# Patient Record
Sex: FEMALE | Race: WHITE | NOT HISPANIC OR LATINO | URBAN - METROPOLITAN AREA
[De-identification: names, ages, dates, MRNs, and addresses within clinical notes are randomized per-mention and may not be internally consistent; named-entity substitution may affect disease eponyms.]

---

## 2017-04-19 ENCOUNTER — APPOINTMENT (OUTPATIENT)
Dept: WOMENS IMAGING | Facility: HOSPITAL | Age: 52
End: 2017-04-19

## 2017-04-19 PROCEDURE — 77067 SCR MAMMO BI INCL CAD: CPT | Performed by: RADIOLOGY

## 2018-05-24 ENCOUNTER — APPOINTMENT (OUTPATIENT)
Dept: WOMENS IMAGING | Facility: HOSPITAL | Age: 53
End: 2018-05-24

## 2018-05-24 PROCEDURE — 77067 SCR MAMMO BI INCL CAD: CPT | Performed by: RADIOLOGY

## 2019-07-09 ENCOUNTER — APPOINTMENT (OUTPATIENT)
Dept: WOMENS IMAGING | Facility: HOSPITAL | Age: 54
End: 2019-07-09

## 2019-07-09 PROCEDURE — 77067 SCR MAMMO BI INCL CAD: CPT | Performed by: RADIOLOGY

## 2020-07-10 ENCOUNTER — APPOINTMENT (OUTPATIENT)
Dept: WOMENS IMAGING | Facility: HOSPITAL | Age: 55
End: 2020-07-10

## 2020-07-10 PROCEDURE — 77067 SCR MAMMO BI INCL CAD: CPT | Performed by: RADIOLOGY

## 2022-07-08 RX ORDER — METHOCARBAMOL 750 MG/1
TABLET, FILM COATED ORAL
Qty: 60 TABLET | Refills: 5 | Status: SHIPPED | OUTPATIENT
Start: 2022-07-08 | End: 2022-11-07 | Stop reason: SDUPTHER

## 2022-07-22 RX ORDER — OXYBUTYNIN CHLORIDE 10 MG/1
TABLET, EXTENDED RELEASE ORAL
Qty: 90 TABLET | Refills: 1 | OUTPATIENT
Start: 2022-07-22

## 2022-08-02 ENCOUNTER — TELEPHONE (OUTPATIENT)
Dept: FAMILY MEDICINE CLINIC | Facility: CLINIC | Age: 57
End: 2022-08-02

## 2022-08-02 NOTE — TELEPHONE ENCOUNTER
Caller: Martinez Toma    Relationship: Self    Best call back number: [phone]    What medication are you requesting: SOMETHING TO HELP WITH BODY ACHES AND FEVER    What are your current symptoms: BODY ACHES AND FEVER. 102.8 ON 8/1/22 .4 ON 8/2/22    How long have you been experiencing symptoms: 8/1/22    Have you had these symptoms before:    [] Yes  [x] No    Have you been treated for these symptoms before:   [] Yes  [x] No    If a prescription is needed, what is your preferred pharmacy and phone number:    [facility]/pharmacy #[id number] - [facility] [address] - [phone] [facility] [phone]      Additional notes: PLEASE CALL PATIENT WHEN MEDICATION IS CALLED IN.

## 2022-08-03 ENCOUNTER — TELEPHONE (OUTPATIENT)
Dept: FAMILY MEDICINE CLINIC | Facility: CLINIC | Age: 57
End: 2022-08-03

## 2022-08-03 NOTE — TELEPHONE ENCOUNTER
Caller: Toma Martinez    Relationship to patient: Self    Best call back number: [phone]    Chief complaint: CHEST CONGESTION     Type of visit: MYCHART VIDEO VISIT    Requested date: ASAP    If rescheduling, when is the original appointment: N/A    Additional notes: PATIENT STATED THAT SHE IS HAVING LOTS OF CHEST CONGESTION AND HER FEVER BROKE BUT WANTED TO SEE ABOUT GETTING TELEHEALTH DUE TO BEING POSITIVE FOR COVID     PLEASE ADVISE

## 2022-08-26 RX ORDER — OXYBUTYNIN CHLORIDE 10 MG/1
TABLET, EXTENDED RELEASE ORAL
Qty: 90 TABLET | Refills: 1 | OUTPATIENT
Start: 2022-08-26

## 2022-08-30 RX ORDER — LISINOPRIL 20 MG/1
TABLET ORAL
Qty: 90 TABLET | Refills: 1 | OUTPATIENT
Start: 2022-08-30

## 2022-09-14 RX ORDER — TRAZODONE HYDROCHLORIDE 50 MG/1
TABLET ORAL
Qty: 270 TABLET | Refills: 3 | OUTPATIENT
Start: 2022-09-14

## 2022-10-03 RX ORDER — BUSPIRONE HYDROCHLORIDE 7.5 MG/1
TABLET ORAL
Qty: 360 TABLET | Refills: 1 | Status: SHIPPED | OUTPATIENT
Start: 2022-10-03 | End: 2022-11-07

## 2022-10-03 RX ORDER — LEVOTHYROXINE SODIUM 0.07 MG/1
TABLET ORAL
Qty: 90 TABLET | Refills: 1 | Status: SHIPPED | OUTPATIENT
Start: 2022-10-03 | End: 2022-11-07 | Stop reason: SDUPTHER

## 2022-10-07 ENCOUNTER — TRANSCRIBE ORDERS (OUTPATIENT)
Dept: LAB | Facility: HOSPITAL | Age: 57
End: 2022-10-07

## 2022-10-07 ENCOUNTER — LAB (OUTPATIENT)
Dept: LAB | Facility: HOSPITAL | Age: 57
End: 2022-10-07

## 2022-10-07 DIAGNOSIS — R63.5 ABNORMAL WEIGHT GAIN: ICD-10-CM

## 2022-10-07 DIAGNOSIS — R63.5 ABNORMAL WEIGHT GAIN: Primary | ICD-10-CM

## 2022-10-07 LAB
ALBUMIN SERPL-MCNC: 4.2 G/DL (ref 3.5–5.2)
ALBUMIN/GLOB SERPL: 1.6 G/DL
ALP SERPL-CCNC: 107 U/L (ref 39–117)
ALT SERPL W P-5'-P-CCNC: 21 U/L (ref 1–33)
ANION GAP SERPL CALCULATED.3IONS-SCNC: 7.6 MMOL/L (ref 5–15)
AST SERPL-CCNC: 23 U/L (ref 1–32)
BASOPHILS # BLD AUTO: 0.03 10*3/MM3 (ref 0–0.2)
BASOPHILS NFR BLD AUTO: 0.5 % (ref 0–1.5)
BILIRUB SERPL-MCNC: 0.2 MG/DL (ref 0–1.2)
BUN SERPL-MCNC: 17 MG/DL (ref 6–20)
BUN/CREAT SERPL: 12.9 (ref 7–25)
CALCIUM SPEC-SCNC: 9.2 MG/DL (ref 8.6–10.5)
CHLORIDE SERPL-SCNC: 103 MMOL/L (ref 98–107)
CHOLEST SERPL-MCNC: 195 MG/DL (ref 0–200)
CO2 SERPL-SCNC: 27.4 MMOL/L (ref 22–29)
CREAT SERPL-MCNC: 1.32 MG/DL (ref 0.57–1)
DEPRECATED RDW RBC AUTO: 39.2 FL (ref 37–54)
EGFRCR SERPLBLD CKD-EPI 2021: 47.2 ML/MIN/1.73
EOSINOPHIL # BLD AUTO: 0.09 10*3/MM3 (ref 0–0.4)
EOSINOPHIL NFR BLD AUTO: 1.5 % (ref 0.3–6.2)
ERYTHROCYTE [DISTWIDTH] IN BLOOD BY AUTOMATED COUNT: 12.8 % (ref 12.3–15.4)
GLOBULIN UR ELPH-MCNC: 2.6 GM/DL
GLUCOSE SERPL-MCNC: 89 MG/DL (ref 65–99)
HBA1C MFR BLD: 5.9 % (ref 4.8–5.6)
HCT VFR BLD AUTO: 39.2 % (ref 34–46.6)
HDLC SERPL QL: 3.68
HDLC SERPL-MCNC: 53 MG/DL (ref 40–60)
HGB BLD-MCNC: 13.3 G/DL (ref 12–15.9)
IMM GRANULOCYTES # BLD AUTO: 0.01 10*3/MM3 (ref 0–0.05)
IMM GRANULOCYTES NFR BLD AUTO: 0.2 % (ref 0–0.5)
LDLC SERPL CALC-MCNC: 111 MG/DL (ref 0–100)
LYMPHOCYTES # BLD AUTO: 2.13 10*3/MM3 (ref 0.7–3.1)
LYMPHOCYTES NFR BLD AUTO: 35.9 % (ref 19.6–45.3)
MCH RBC QN AUTO: 28.6 PG (ref 26.6–33)
MCHC RBC AUTO-ENTMCNC: 33.9 G/DL (ref 31.5–35.7)
MCV RBC AUTO: 84.3 FL (ref 79–97)
MONOCYTES # BLD AUTO: 0.35 10*3/MM3 (ref 0.1–0.9)
MONOCYTES NFR BLD AUTO: 5.9 % (ref 5–12)
NEUTROPHILS NFR BLD AUTO: 3.33 10*3/MM3 (ref 1.7–7)
NEUTROPHILS NFR BLD AUTO: 56 % (ref 42.7–76)
NRBC BLD AUTO-RTO: 0 /100 WBC (ref 0–0.2)
PLATELET # BLD AUTO: 291 10*3/MM3 (ref 140–450)
PMV BLD AUTO: 10.5 FL (ref 6–12)
POTASSIUM SERPL-SCNC: 4.5 MMOL/L (ref 3.5–5.2)
PROT SERPL-MCNC: 6.8 G/DL (ref 6–8.5)
RBC # BLD AUTO: 4.65 10*6/MM3 (ref 3.77–5.28)
SODIUM SERPL-SCNC: 138 MMOL/L (ref 136–145)
TRIGL SERPL-MCNC: 176 MG/DL (ref 0–150)
TSH SERPL DL<=0.05 MIU/L-ACNC: 1.99 UIU/ML (ref 0.27–4.2)
VLDLC SERPL-MCNC: 31 MG/DL (ref 5–40)
WBC NRBC COR # BLD: 5.94 10*3/MM3 (ref 3.4–10.8)

## 2022-10-07 PROCEDURE — 36415 COLL VENOUS BLD VENIPUNCTURE: CPT

## 2022-10-07 PROCEDURE — 83036 HEMOGLOBIN GLYCOSYLATED A1C: CPT | Performed by: NURSE PRACTITIONER

## 2022-10-07 PROCEDURE — 80061 LIPID PANEL: CPT

## 2022-10-07 PROCEDURE — 80050 GENERAL HEALTH PANEL: CPT

## 2022-11-07 ENCOUNTER — TELEMEDICINE (OUTPATIENT)
Dept: FAMILY MEDICINE CLINIC | Facility: CLINIC | Age: 57
End: 2022-11-07

## 2022-11-07 DIAGNOSIS — U09.9 POST-COVID CHRONIC FATIGUE: Primary | ICD-10-CM

## 2022-11-07 DIAGNOSIS — G93.32 POST-COVID CHRONIC FATIGUE: Primary | ICD-10-CM

## 2022-11-07 PROCEDURE — 99213 OFFICE O/P EST LOW 20 MIN: CPT | Performed by: FAMILY MEDICINE

## 2022-11-07 RX ORDER — BUSPIRONE HYDROCHLORIDE 15 MG/1
7.5 TABLET ORAL 2 TIMES DAILY
Qty: 45 TABLET | Refills: 1 | Status: SHIPPED | OUTPATIENT
Start: 2022-11-07 | End: 2023-03-15

## 2022-11-07 RX ORDER — METHOCARBAMOL 750 MG/1
750 TABLET, FILM COATED ORAL 3 TIMES DAILY
Qty: 90 TABLET | Refills: 1 | Status: SHIPPED | OUTPATIENT
Start: 2022-11-07 | End: 2023-01-30

## 2022-11-07 RX ORDER — BUPROPION HYDROCHLORIDE 150 MG/1
150 TABLET ORAL EVERY MORNING
Qty: 30 TABLET | Refills: 5 | Status: SHIPPED | OUTPATIENT
Start: 2022-11-07 | End: 2022-11-28

## 2022-11-07 RX ORDER — OXYBUTYNIN CHLORIDE 10 MG/1
10 TABLET, EXTENDED RELEASE ORAL DAILY
Qty: 90 TABLET | Refills: 1 | Status: SHIPPED | OUTPATIENT
Start: 2022-11-07

## 2022-11-07 RX ORDER — TRAZODONE HYDROCHLORIDE 50 MG/1
50 TABLET ORAL NIGHTLY
Qty: 90 TABLET | Refills: 1 | Status: SHIPPED | OUTPATIENT
Start: 2022-11-07

## 2022-11-07 RX ORDER — LISINOPRIL 20 MG/1
20 TABLET ORAL DAILY
Qty: 90 TABLET | Refills: 1 | Status: SHIPPED | OUTPATIENT
Start: 2022-11-07

## 2022-11-07 RX ORDER — OXYBUTYNIN CHLORIDE 10 MG/1
10 TABLET, EXTENDED RELEASE ORAL DAILY
COMMUNITY
End: 2022-11-07 | Stop reason: SDUPTHER

## 2022-11-07 RX ORDER — LEVOTHYROXINE SODIUM 0.07 MG/1
75 TABLET ORAL DAILY
Qty: 90 TABLET | Refills: 1 | Status: SHIPPED | OUTPATIENT
Start: 2022-11-07

## 2022-11-07 RX ORDER — LISINOPRIL 20 MG/1
20 TABLET ORAL DAILY
COMMUNITY
End: 2022-11-07 | Stop reason: SDUPTHER

## 2022-11-07 RX ORDER — TRAZODONE HYDROCHLORIDE 50 MG/1
50 TABLET ORAL NIGHTLY
COMMUNITY
End: 2022-11-07 | Stop reason: SDUPTHER

## 2022-11-07 NOTE — PROGRESS NOTES
Patient was seen today through synchronous audio/video technology. Verbal consent was obtained. The patient was located at home. Dr. Basurto was located at [facility] in [place], [place]. Vitals signs were not obtained due to lack of home monitoring access.       Date: 2022   Patient Name: Toma Martinez  : 1965   MRN: [id number]     Chief Complaint:    Chief Complaint   Patient presents with   • Med Refill       History of Present Illness: Toma Martinez is a 57 y.o. female who is here today to follow up for Medication refills.  Today she complains of significant chronic fatigue after having COVID a few months ago.  She reports difficulty functioning and not feeling well rested when she wakes in the morning.  She feels like she needs multiple naps throughout the day.  This is interfering with her daily life and ability to be productive with her job.           Review of Systems:   Review of Systems   Constitutional: Positive for fatigue. Negative for chills and fever.   Respiratory: Negative for cough and shortness of breath.    Cardiovascular: Negative for chest pain and palpitations.   Gastrointestinal: Negative for abdominal pain, constipation, diarrhea, nausea and vomiting.   Musculoskeletal: Negative for back pain and myalgias.   Neurological: Negative for dizziness and headache.   Psychiatric/Behavioral: Negative for depressed mood. The patient is not nervous/anxious.        Past Medical History:   Past Medical History:   Diagnosis Date   • Breast cyst, left    • Cholelithiasis    • Depression     DRUG THERAPY   • Depressive disorder    • Hypertension    • Tonsillitis        Past Surgical History:   Past Surgical History:   Procedure Laterality Date   • BREAST CYST EXCISION Left     BENIGN   • CHOLECYSTECTOMY  2009   • TONSILLECTOMY         Family History:   Family History   Problem Relation Age of Onset   • Hypertension Mother    • Clotting disorder Father         BLOOD CLOT    • Coronary artery disease Maternal Grandmother    • Coronary artery disease Maternal Grandfather    • Stroke Paternal Grandmother        Social History:   Social History     Socioeconomic History   • Marital status:    Tobacco Use   • Smoking status: Never   • Smokeless tobacco: Never   Vaping Use   • Vaping Use: Never used   Substance and Sexual Activity   • Alcohol use: Never   • Drug use: Never   • Sexual activity: Defer       Medications:     Current Outpatient Medications:   •  levothyroxine (SYNTHROID, LEVOTHROID) 75 MCG tablet, Take 1 tablet by mouth Daily., Disp: 90 tablet, Rfl: 1  •  lisinopril (PRINIVIL,ZESTRIL) 20 MG tablet, Take 1 tablet by mouth Daily., Disp: 90 tablet, Rfl: 1  •  methocarbamol (ROBAXIN) 750 MG tablet, Take 1 tablet by mouth 3 (Three) Times a Day., Disp: 90 tablet, Rfl: 1  •  oxybutynin XL (DITROPAN-XL) 10 MG 24 hr tablet, Take 1 tablet by mouth Daily., Disp: 90 tablet, Rfl: 1  •  traZODone (DESYREL) 50 MG tablet, Take 1 tablet by mouth Every Night., Disp: 90 tablet, Rfl: 1  •  buPROPion XL (Wellbutrin XL) 150 MG 24 hr tablet, Take 1 tablet by mouth Every Morning., Disp: 30 tablet, Rfl: 5  •  busPIRone (BUSPAR) 15 MG tablet, Take 0.5 tablets by mouth 2 (Two) Times a Day., Disp: 45 tablet, Rfl: 1    Allergies:   Allergies   Allergen Reactions   • Penicillins Hives       PHQ-2 Total Score: 0   PHQ-9 Total Score: 0     Physical Exam:  Vital Signs: There were no vitals filed for this visit.  There is no height or weight on file to calculate BMI.     Physical Exam  Vitals and nursing note reviewed.   Constitutional:       Appearance: Normal appearance.   HENT:      Head: Normocephalic.   Pulmonary:      Effort: Pulmonary effort is normal.   Neurological:      Mental Status: She is alert and oriented to person, place, and time.   Psychiatric:         Mood and Affect: Mood normal.         Behavior: Behavior normal.            Assessment/Plan:   Diagnoses and all orders for this  visit:    1. Post-COVID chronic fatigue (Primary)  Assessment & Plan:  I have had some success with Wellbutrin 150 mg daily for post COVID fatigue.  Patient is agreeable to trying this.  Side effects discussed, follow-up in 3 weeks    Orders:  -     buPROPion XL (Wellbutrin XL) 150 MG 24 hr tablet; Take 1 tablet by mouth Every Morning.  Dispense: 30 tablet; Refill: 5    Other orders  -     levothyroxine (SYNTHROID, LEVOTHROID) 75 MCG tablet; Take 1 tablet by mouth Daily.  Dispense: 90 tablet; Refill: 1  -     lisinopril (PRINIVIL,ZESTRIL) 20 MG tablet; Take 1 tablet by mouth Daily.  Dispense: 90 tablet; Refill: 1  -     methocarbamol (ROBAXIN) 750 MG tablet; Take 1 tablet by mouth 3 (Three) Times a Day.  Dispense: 90 tablet; Refill: 1  -     oxybutynin XL (DITROPAN-XL) 10 MG 24 hr tablet; Take 1 tablet by mouth Daily.  Dispense: 90 tablet; Refill: 1  -     traZODone (DESYREL) 50 MG tablet; Take 1 tablet by mouth Every Night.  Dispense: 90 tablet; Refill: 1  -     busPIRone (BUSPAR) 15 MG tablet; Take 0.5 tablets by mouth 2 (Two) Times a Day.  Dispense: 45 tablet; Refill: 1         Follow Up:   Return in about 3 weeks (around 11/28/2022) for Med Recheck.    Aspen Basurto DO  [facility] [place] [facility]

## 2022-11-09 NOTE — ASSESSMENT & PLAN NOTE
I have had some success with Wellbutrin 150 mg daily for post COVID fatigue.  Patient is agreeable to trying this.  Side effects discussed, follow-up in 3 weeks

## 2022-11-28 ENCOUNTER — OFFICE VISIT (OUTPATIENT)
Dept: FAMILY MEDICINE CLINIC | Facility: CLINIC | Age: 57
End: 2022-11-28

## 2022-11-28 DIAGNOSIS — R05.1 ACUTE COUGH: ICD-10-CM

## 2022-11-28 DIAGNOSIS — U09.9 POST-COVID CHRONIC FATIGUE: Primary | ICD-10-CM

## 2022-11-28 DIAGNOSIS — G93.32 POST-COVID CHRONIC FATIGUE: Primary | ICD-10-CM

## 2022-11-28 PROCEDURE — 99213 OFFICE O/P EST LOW 20 MIN: CPT | Performed by: FAMILY MEDICINE

## 2022-11-28 RX ORDER — BUPROPION HYDROCHLORIDE 300 MG/1
300 TABLET ORAL EVERY MORNING
Qty: 90 TABLET | Refills: 1 | Status: SHIPPED | OUTPATIENT
Start: 2022-11-28

## 2022-11-28 RX ORDER — DEXTROMETHORPHAN HYDROBROMIDE AND PROMETHAZINE HYDROCHLORIDE 15; 6.25 MG/5ML; MG/5ML
5 SYRUP ORAL 4 TIMES DAILY PRN
Qty: 240 ML | Refills: 0 | Status: SHIPPED | OUTPATIENT
Start: 2022-11-28

## 2022-11-28 RX ORDER — METHYLPREDNISOLONE 4 MG/1
TABLET ORAL
Qty: 21 TABLET | Refills: 0 | Status: SHIPPED | OUTPATIENT
Start: 2022-11-28 | End: 2023-01-06

## 2022-11-28 NOTE — PROGRESS NOTES
Patient was seen today through synchronous audio/video technology. Verbal consent was obtained. The patient was located at work. Dr. Basurto was located at [facility] in [place], [place]. Vitals signs were not obtained due to lack of home monitoring access.        Date: 2022   Patient Name: Toma Martinez  : 1965   MRN: [id number]     Chief Complaint:    Chief Complaint   Patient presents with   • Fatigue     Follow up on Wellbutrin       History of Present Illness: Toma Martinez is a 57 y.o. female who is here today for Follow-up for post-COVID chronic fatigue.  She started Wellbutrin and was initially seeing improvement and then came down with what she thinks was the flu.  She reports that she has been sick for the past 2 weeks and now has residual cough that is worse at night.  She has been unable to sleep which is worsening the fatigue.  She denies side effects to the Wellbutrin and is open to increasing the medication.           Review of Systems:   Review of Systems   Constitutional: Positive for fatigue. Negative for chills and fever.   Respiratory: Positive for cough. Negative for shortness of breath.    Cardiovascular: Negative for chest pain and palpitations.   Gastrointestinal: Negative for abdominal pain, constipation, diarrhea, nausea and vomiting.   Musculoskeletal: Negative for back pain and myalgias.   Neurological: Negative for dizziness and headache.   Psychiatric/Behavioral: Positive for sleep disturbance. Negative for depressed mood. The patient is not nervous/anxious.        Past Medical History:   Past Medical History:   Diagnosis Date   • Breast cyst, left    • Cholelithiasis    • Depression     DRUG THERAPY   • Depressive disorder    • Hypertension    • Tonsillitis        Past Surgical History:   Past Surgical History:   Procedure Laterality Date   • BREAST CYST EXCISION Left     BENIGN   • CHOLECYSTECTOMY  2009   • TONSILLECTOMY         Family History:    Family History   Problem Relation Age of Onset   • Hypertension Mother    • Clotting disorder Father         BLOOD CLOT   • Coronary artery disease Maternal Grandmother    • Coronary artery disease Maternal Grandfather    • Stroke Paternal Grandmother        Social History:   Social History     Socioeconomic History   • Marital status:    Tobacco Use   • Smoking status: Never   • Smokeless tobacco: Never   Vaping Use   • Vaping Use: Never used   Substance and Sexual Activity   • Alcohol use: Never   • Drug use: Never   • Sexual activity: Defer       Medications:     Current Outpatient Medications:   •  buPROPion XL (Wellbutrin XL) 300 MG 24 hr tablet, Take 1 tablet by mouth Every Morning., Disp: 90 tablet, Rfl: 1  •  busPIRone (BUSPAR) 15 MG tablet, Take 0.5 tablets by mouth 2 (Two) Times a Day., Disp: 45 tablet, Rfl: 1  •  levothyroxine (SYNTHROID, LEVOTHROID) 75 MCG tablet, Take 1 tablet by mouth Daily., Disp: 90 tablet, Rfl: 1  •  lisinopril (PRINIVIL,ZESTRIL) 20 MG tablet, Take 1 tablet by mouth Daily., Disp: 90 tablet, Rfl: 1  •  methocarbamol (ROBAXIN) 750 MG tablet, Take 1 tablet by mouth 3 (Three) Times a Day., Disp: 90 tablet, Rfl: 1  •  methylPREDNISolone (MEDROL) 4 MG dose pack, Take as directed on package instructions., Disp: 21 tablet, Rfl: 0  •  oxybutynin XL (DITROPAN-XL) 10 MG 24 hr tablet, Take 1 tablet by mouth Daily., Disp: 90 tablet, Rfl: 1  •  promethazine-dextromethorphan (PROMETHAZINE-DM) 6.25-15 MG/5ML syrup, Take 5 mL by mouth 4 (Four) Times a Day As Needed for Cough., Disp: 240 mL, Rfl: 0  •  traZODone (DESYREL) 50 MG tablet, Take 1 tablet by mouth Every Night., Disp: 90 tablet, Rfl: 1    Allergies:   Allergies   Allergen Reactions   • Penicillins Hives         Physical Exam:  Vital Signs: There were no vitals filed for this visit.  There is no height or weight on file to calculate BMI.     Physical Exam  Vitals and nursing note reviewed.   Constitutional:       Appearance: Normal  appearance.   HENT:      Head: Normocephalic.   Pulmonary:      Effort: Pulmonary effort is normal.   Neurological:      Mental Status: She is alert and oriented to person, place, and time.   Psychiatric:         Mood and Affect: Mood normal.         Behavior: Behavior normal.           Assessment/Plan:   Diagnoses and all orders for this visit:    1. Post-COVID chronic fatigue (Primary)  Assessment & Plan:  We will increase Wellbutrin to 300 mg daily.  Side effects discussed    Orders:  -     buPROPion XL (Wellbutrin XL) 300 MG 24 hr tablet; Take 1 tablet by mouth Every Morning.  Dispense: 90 tablet; Refill: 1    2. Acute cough  Assessment & Plan:  Rx Medrol pack and Promethazine DM to use at bedtime as needed.  Side effects discussed including sedation    Orders:  -     methylPREDNISolone (MEDROL) 4 MG dose pack; Take as directed on package instructions.  Dispense: 21 tablet; Refill: 0  -     promethazine-dextromethorphan (PROMETHAZINE-DM) 6.25-15 MG/5ML syrup; Take 5 mL by mouth 4 (Four) Times a Day As Needed for Cough.  Dispense: 240 mL; Refill: 0         Follow Up:   Return for already has appt.    Aspne Basurto DO  [facility] [place] [facility]

## 2022-11-28 NOTE — ASSESSMENT & PLAN NOTE
Rx Medrol pack and Promethazine DM to use at bedtime as needed.  Side effects discussed including sedation

## 2023-01-06 ENCOUNTER — OFFICE VISIT (OUTPATIENT)
Dept: FAMILY MEDICINE CLINIC | Facility: CLINIC | Age: 58
End: 2023-01-06
Payer: COMMERCIAL

## 2023-01-06 VITALS
WEIGHT: 243 LBS | BODY MASS INDEX: 38.14 KG/M2 | HEIGHT: 67 IN | TEMPERATURE: 97.3 F | DIASTOLIC BLOOD PRESSURE: 64 MMHG | HEART RATE: 77 BPM | OXYGEN SATURATION: 97 % | SYSTOLIC BLOOD PRESSURE: 100 MMHG

## 2023-01-06 DIAGNOSIS — F41.1 GAD (GENERALIZED ANXIETY DISORDER): ICD-10-CM

## 2023-01-06 DIAGNOSIS — N39.46 MIXED STRESS AND URGE URINARY INCONTINENCE: ICD-10-CM

## 2023-01-06 DIAGNOSIS — Z12.31 ENCOUNTER FOR SCREENING MAMMOGRAM FOR MALIGNANT NEOPLASM OF BREAST: ICD-10-CM

## 2023-01-06 DIAGNOSIS — F33.0 MILD EPISODE OF RECURRENT MAJOR DEPRESSIVE DISORDER: ICD-10-CM

## 2023-01-06 DIAGNOSIS — I10 PRIMARY HYPERTENSION: ICD-10-CM

## 2023-01-06 DIAGNOSIS — Z12.11 SCREENING FOR COLORECTAL CANCER: ICD-10-CM

## 2023-01-06 DIAGNOSIS — E03.9 ACQUIRED HYPOTHYROIDISM: ICD-10-CM

## 2023-01-06 DIAGNOSIS — N18.31 STAGE 3A CHRONIC KIDNEY DISEASE: ICD-10-CM

## 2023-01-06 DIAGNOSIS — Z12.12 SCREENING FOR COLORECTAL CANCER: ICD-10-CM

## 2023-01-06 DIAGNOSIS — Z00.00 ENCOUNTER FOR WELLNESS EXAMINATION IN ADULT: Primary | ICD-10-CM

## 2023-01-06 DIAGNOSIS — E66.01 MORBID OBESITY: ICD-10-CM

## 2023-01-06 PROCEDURE — 36415 COLL VENOUS BLD VENIPUNCTURE: CPT | Performed by: FAMILY MEDICINE

## 2023-01-06 PROCEDURE — 99396 PREV VISIT EST AGE 40-64: CPT | Performed by: FAMILY MEDICINE

## 2023-01-06 NOTE — ASSESSMENT & PLAN NOTE
Patient's depression is recurrent and is mild without psychosis. Their depression is currently active and the condition is unchanged. This will be reassessed at the next regular appointment. F/U as described:patient will continue current medication therapy.

## 2023-01-06 NOTE — ASSESSMENT & PLAN NOTE
Patient's (Body mass index is 38.06 kg/m².) indicates that they are morbidly/severely obese (BMI > 40 or > 35 with obesity - related health condition) with health conditions that include hypertension . Weight is unchanged. BMI is is above average; BMI management plan is completed. We discussed portion control and increasing exercise.

## 2023-01-06 NOTE — ASSESSMENT & PLAN NOTE
Hypertension is unchanged.  Continue current treatment regimen.  Weight loss.  Regular aerobic exercise.  Blood pressure will be reassessed at the next regular appointment.

## 2023-01-06 NOTE — ASSESSMENT & PLAN NOTE
Renal condition is unchanged.  Continue current treatment regimen.  Renal condition will be reassessed in 6 months.

## 2023-01-06 NOTE — PROGRESS NOTES
Patient Name: Toma Martinez  : 1965   MRN: [id number]     Chief Complaint:    Chief Complaint   Patient presents with   • Annual Exam       History of Present Illness: Toma Martinez is a 57 y.o. female who is here today for their annual health maintenance and physical. Patient presents for follow-up on chronic medical problems including hypertension, hypothyroidism, depression, arthritis, CKD stage IIIa and anxiety. Patient denies adverse effects of medications, headache, dizziness, chest pain, palpitations, shortness of breath and cough. Patient complains of no significant issue. Patient is here for monitoring of chronic issues and fasting lab work.  She is due for mammogram and colon screening.  She denies family history of colon or breast cancer.             Review of Systems:   Review of Systems   Constitutional: Negative for chills, fatigue and fever.   Respiratory: Negative for cough and shortness of breath.    Cardiovascular: Negative for chest pain and palpitations.   Gastrointestinal: Negative for abdominal pain, constipation, diarrhea, nausea and vomiting.   Musculoskeletal: Positive for arthralgias. Negative for back pain and myalgias.   Neurological: Negative for dizziness and headache.   Psychiatric/Behavioral: Negative for depressed mood. The patient is not nervous/anxious.        Past Medical History, Social History, Family History and Care Team were all reviewed with patient and updated as appropriate.     Medications:     Current Outpatient Medications:   •  buPROPion XL (Wellbutrin XL) 300 MG 24 hr tablet, Take 1 tablet by mouth Every Morning., Disp: 90 tablet, Rfl: 1  •  busPIRone (BUSPAR) 15 MG tablet, Take 0.5 tablets by mouth 2 (Two) Times a Day., Disp: 45 tablet, Rfl: 1  •  levothyroxine (SYNTHROID, LEVOTHROID) 75 MCG tablet, Take 1 tablet by mouth Daily., Disp: 90 tablet, Rfl: 1  •  lisinopril (PRINIVIL,ZESTRIL) 20 MG tablet, Take 1 tablet by mouth Daily., Disp: 90 tablet, Rfl:  1  •  methocarbamol (ROBAXIN) 750 MG tablet, Take 1 tablet by mouth 3 (Three) Times a Day., Disp: 90 tablet, Rfl: 1  •  oxybutynin XL (DITROPAN-XL) 10 MG 24 hr tablet, Take 1 tablet by mouth Daily., Disp: 90 tablet, Rfl: 1  •  promethazine-dextromethorphan (PROMETHAZINE-DM) 6.25-15 MG/5ML syrup, Take 5 mL by mouth 4 (Four) Times a Day As Needed for Cough., Disp: 240 mL, Rfl: 0  •  traZODone (DESYREL) 50 MG tablet, Take 1 tablet by mouth Every Night., Disp: 90 tablet, Rfl: 1    Allergies:   Allergies   Allergen Reactions   • Penicillins Hives         Depression: PHQ-2 Depression Screening  PHQ-2 Total Score:     PHQ-9 Total Score:       Intimate partner violence: (Screen on initial visit, pregnant women, women with injuries, older adult with injury or evidence of neglect):  • Violence can be a problem in many people's lives, so I now ask every patient about trauma or abuse they may have experienced in a relationship.  • Stress/Safety - Do you feel safe in your relationship?  • Afraid/Abused - Have you ever been in a relationship where you were threatened, hurt, or afraid?  • Friend/Family - Are your friends aware you have been hurt?  • Emergency Plan - Do you have a safe place to go and the resources you need in an emergency?    Osteoporosis:   • Ost menopausal women < 65 with RF (advancing age, previous fracture, glucocorticoid therapy, parental hip fracture, low body weight, current cigarette smoking, excessive alcohol consumption, rheumatoid arthritis, secondary osteoporosis [hypogonadism/premature menopause, malabsorption, chronic liver disease, IBD]).  • All women 65 or older      Physical Exam:  Vital Signs:   Vitals:    01/06/23 0805   BP: 100/64   BP Location: Left arm   Patient Position: Sitting   Cuff Size: Large Adult   Pulse: 77   Temp: 97.3 °F (36.3 °C)   TempSrc: Temporal   SpO2: 97%   Weight: 110 kg (243 lb)   Height: 170.2 cm (67\")     Body mass index is 38.06 kg/m².     Physical Exam  Vitals and  nursing note reviewed.   Constitutional:       Appearance: Normal appearance. She is obese.   HENT:      Head: Normocephalic and atraumatic.      Right Ear: Tympanic membrane and ear canal normal.      Left Ear: Tympanic membrane and ear canal normal.      Nose: Nose normal.      Mouth/Throat:      Mouth: Mucous membranes are moist.      Pharynx: Oropharynx is clear.   Eyes:      Conjunctiva/sclera: Conjunctivae normal.      Pupils: Pupils are equal, round, and reactive to light.   Cardiovascular:      Rate and Rhythm: Normal rate and regular rhythm.      Heart sounds: Normal heart sounds. No murmur heard.  Pulmonary:      Effort: Pulmonary effort is normal.      Breath sounds: Normal breath sounds. No wheezing, rhonchi or rales.   Abdominal:      General: Bowel sounds are normal.      Palpations: Abdomen is soft.      Tenderness: There is no abdominal tenderness.   Musculoskeletal:         General: Normal range of motion.      Cervical back: Normal range of motion and neck supple.      Right lower leg: No edema.      Left lower leg: No edema.   Lymphadenopathy:      Cervical: No cervical adenopathy.   Skin:     General: Skin is warm.      Findings: No rash.   Neurological:      General: No focal deficit present.      Mental Status: She is alert and oriented to person, place, and time.      Motor: No weakness.   Psychiatric:         Mood and Affect: Mood normal.         Behavior: Behavior normal.         Procedures      Assessment/Plan:   Diagnoses and all orders for this visit:    1. Encounter for wellness examination in adult (Primary)  Assessment & Plan:  Fasting labs today, mammogram and Cologuard ordered    Orders:  -     Hemoglobin A1c; Future  -     CBC Auto Differential; Future  -     Comprehensive Metabolic Panel; Future  -     Lipid Panel; Future  -     TSH; Future  -     T4, Free; Future    2. Mild episode of recurrent major depressive disorder (HCC)  Assessment & Plan:  Patient's depression is recurrent  and is mild without psychosis. Their depression is currently active and the condition is unchanged. This will be reassessed at the next regular appointment. F/U as described:patient will continue current medication therapy.      3. YUMIKO (generalized anxiety disorder)  Assessment & Plan:  Psychological condition is unchanged.  Continue current treatment regimen.  Psychological condition  will be reassessed at the next regular appointment.      4. Acquired hypothyroidism  Assessment & Plan:  Stable, labs today      5. Primary hypertension  Assessment & Plan:  Hypertension is unchanged.  Continue current treatment regimen.  Weight loss.  Regular aerobic exercise.  Blood pressure will be reassessed at the next regular appointment.      6. Stage 3a chronic kidney disease (HCC)  Assessment & Plan:  Renal condition is unchanged.  Continue current treatment regimen.  Renal condition will be reassessed in 6 months.      7. Mixed stress and urge urinary incontinence  Assessment & Plan:  Stable on oxybutynin      8. Encounter for screening mammogram for malignant neoplasm of breast  -     Mammo Screening Bilateral With CAD; Future    9. Screening for colorectal cancer  -     Cologuard - Stool, Per Rectum; Future    10. Morbid obesity (HCC)  Assessment & Plan:  Patient's (Body mass index is 38.06 kg/m².) indicates that they are morbidly/severely obese (BMI > 40 or > 35 with obesity - related health condition) with health conditions that include hypertension . Weight is unchanged. BMI is is above average; BMI management plan is completed. We discussed portion control and increasing exercise.            Follow Up:   Return in about 6 months (around 7/6/2023) for Recheck with labs.    Healthcare Maintenance:   Counseling provided on Discussed injury prevention, diet and exercise, safe sexual practices, and screening for common diseases. Encouraged use of sunscreen and seatbelts. Encouraged SBE, avoidance of tobacco, limiting  alcohol, and yearly dental and eye exams.   .   Toma Martinez voices understanding and acceptance of this advice and will call back with any further questions or concerns. AVS with preventive healthcare tips printed for patient.     Aspen Basurto DO  [facility]

## 2023-01-06 NOTE — ASSESSMENT & PLAN NOTE
Psychological condition is unchanged.  Continue current treatment regimen.  Psychological condition  will be reassessed at the next regular appointment.

## 2023-01-07 LAB
BASOPHILS # BLD AUTO: 0 X10E3/UL (ref 0–0.2)
BASOPHILS NFR BLD AUTO: 1 %
CHOLEST SERPL-MCNC: 197 MG/DL (ref 100–199)
EOSINOPHIL # BLD AUTO: 0.1 X10E3/UL (ref 0–0.4)
EOSINOPHIL NFR BLD AUTO: 2 %
ERYTHROCYTE [DISTWIDTH] IN BLOOD BY AUTOMATED COUNT: 13.7 % (ref 11.7–15.4)
HBA1C MFR BLD: 5.7 % (ref 4.8–5.6)
HCT VFR BLD AUTO: 41.1 % (ref 34–46.6)
HDLC SERPL-MCNC: 48 MG/DL
HGB BLD-MCNC: 13.4 G/DL (ref 11.1–15.9)
IMM GRANULOCYTES # BLD AUTO: 0 X10E3/UL (ref 0–0.1)
IMM GRANULOCYTES NFR BLD AUTO: 0 %
LDLC SERPL CALC-MCNC: 119 MG/DL (ref 0–99)
LYMPHOCYTES # BLD AUTO: 2 X10E3/UL (ref 0.7–3.1)
LYMPHOCYTES NFR BLD AUTO: 35 %
MCH RBC QN AUTO: 28.5 PG (ref 26.6–33)
MCHC RBC AUTO-ENTMCNC: 32.6 G/DL (ref 31.5–35.7)
MCV RBC AUTO: 87 FL (ref 79–97)
MONOCYTES # BLD AUTO: 0.4 X10E3/UL (ref 0.1–0.9)
MONOCYTES NFR BLD AUTO: 8 %
NEUTROPHILS # BLD AUTO: 3.2 X10E3/UL (ref 1.4–7)
NEUTROPHILS NFR BLD AUTO: 54 %
PLATELET # BLD AUTO: 338 X10E3/UL (ref 150–450)
RBC # BLD AUTO: 4.71 X10E6/UL (ref 3.77–5.28)
T4 FREE SERPL-MCNC: 1.15 NG/DL (ref 0.82–1.77)
TRIGL SERPL-MCNC: 171 MG/DL (ref 0–149)
TSH SERPL DL<=0.005 MIU/L-ACNC: 2 UIU/ML (ref 0.45–4.5)
VLDLC SERPL CALC-MCNC: 30 MG/DL (ref 5–40)
WBC # BLD AUTO: 5.8 X10E3/UL (ref 3.4–10.8)

## 2023-01-20 ENCOUNTER — TELEPHONE (OUTPATIENT)
Dept: FAMILY MEDICINE CLINIC | Facility: CLINIC | Age: 58
End: 2023-01-20

## 2023-01-20 NOTE — TELEPHONE ENCOUNTER
Caller: Toma Martinez    Relationship: Self    Best call back number: [phone]    Caller requesting test results: PATIENT    What test was performed: BLOOD WORK    When was the test performed: LAST WEEK     Where was the test performed: IN OFFICE    Additional notes: PATIENT WOULD LIKE SOMEONE TO GO OVER HER BLOOD WORK WITH HER.

## 2023-01-27 NOTE — TELEPHONE ENCOUNTER
PATIENT CALLED WANTING TO SCHEDULE HER LABS FOR ARTHRITIS. I DON'T SEE AN ORDER IN THE CHART. PLEASE ADVISE.

## 2023-01-30 RX ORDER — METHOCARBAMOL 750 MG/1
TABLET, FILM COATED ORAL
Qty: 90 TABLET | Refills: 1 | Status: SHIPPED | OUTPATIENT
Start: 2023-01-30

## 2023-02-07 ENCOUNTER — OFFICE VISIT (OUTPATIENT)
Dept: FAMILY MEDICINE CLINIC | Facility: CLINIC | Age: 58
End: 2023-02-07
Payer: COMMERCIAL

## 2023-02-07 VITALS — WEIGHT: 232 LBS | HEIGHT: 67 IN | BODY MASS INDEX: 36.41 KG/M2

## 2023-02-07 DIAGNOSIS — E79.0 ELEVATED URIC ACID IN BLOOD: ICD-10-CM

## 2023-02-07 DIAGNOSIS — M15.9 PRIMARY OSTEOARTHRITIS INVOLVING MULTIPLE JOINTS: Primary | ICD-10-CM

## 2023-02-07 PROBLEM — M15.0 PRIMARY OSTEOARTHRITIS INVOLVING MULTIPLE JOINTS: Status: ACTIVE | Noted: 2023-02-07

## 2023-02-07 PROCEDURE — 99213 OFFICE O/P EST LOW 20 MIN: CPT | Performed by: FAMILY MEDICINE

## 2023-02-07 RX ORDER — MELOXICAM 7.5 MG/1
7.5 TABLET ORAL DAILY
Qty: 30 TABLET | Refills: 5 | Status: SHIPPED | OUTPATIENT
Start: 2023-02-07

## 2023-02-07 NOTE — PROGRESS NOTES
Patient was seen today through synchronous audio technology. Verbal consent was obtained. The patient was located at home. Dr. Basurto was located at [facility] in [place], [place]. Vitals signs were obtained by patient and recorded. Time spent with patient was 20 minutes.       Date: 2023   Patient Name: Toma Martinez  : 1965   MRN: [id number]     Chief Complaint:    Chief Complaint   Patient presents with   • Joint pain        History of Present Illness: Toma Martinez is a 57 y.o. female who is here today to follow up for Arthritis.  She reports continued worsening of joint pain with intermittent swelling.  She has had autoimmune labs in the past which were normal aside from mildly elevated uric acid level.  She would like to discuss other options for evaluation and treatment.           Review of Systems:   Review of Systems   Constitutional: Negative for chills, fatigue and fever.   Respiratory: Negative for cough and shortness of breath.    Cardiovascular: Negative for chest pain and palpitations.   Gastrointestinal: Negative for abdominal pain, constipation, diarrhea, nausea and vomiting.   Musculoskeletal: Positive for arthralgias and joint swelling. Negative for back pain and myalgias.   Neurological: Negative for dizziness and headache.   Psychiatric/Behavioral: Negative for depressed mood. The patient is not nervous/anxious.        Past Medical History:   Past Medical History:   Diagnosis Date   • Breast cyst, left    • Cholelithiasis    • Depression     DRUG THERAPY   • Depressive disorder    • Hypertension    • Tonsillitis        Past Surgical History:   Past Surgical History:   Procedure Laterality Date   • BREAST CYST EXCISION Left     BENIGN   • CHOLECYSTECTOMY  2009   • TONSILLECTOMY         Family History:   Family History   Problem Relation Age of Onset   • Hypertension Mother    • Clotting disorder Father         BLOOD CLOT   • Coronary artery disease Maternal  "Grandmother    • Coronary artery disease Maternal Grandfather    • Stroke Paternal Grandmother        Social History:   Social History     Socioeconomic History   • Marital status:    Tobacco Use   • Smoking status: Never   • Smokeless tobacco: Never   Vaping Use   • Vaping Use: Never used   Substance and Sexual Activity   • Alcohol use: Never   • Drug use: Never   • Sexual activity: Defer       Medications:     Current Outpatient Medications:   •  buPROPion XL (Wellbutrin XL) 300 MG 24 hr tablet, Take 1 tablet by mouth Every Morning., Disp: 90 tablet, Rfl: 1  •  busPIRone (BUSPAR) 15 MG tablet, Take 0.5 tablets by mouth 2 (Two) Times a Day., Disp: 45 tablet, Rfl: 1  •  levothyroxine (SYNTHROID, LEVOTHROID) 75 MCG tablet, Take 1 tablet by mouth Daily., Disp: 90 tablet, Rfl: 1  •  lisinopril (PRINIVIL,ZESTRIL) 20 MG tablet, Take 1 tablet by mouth Daily., Disp: 90 tablet, Rfl: 1  •  meloxicam (MOBIC) 7.5 MG tablet, Take 1 tablet by mouth Daily., Disp: 30 tablet, Rfl: 5  •  methocarbamol (ROBAXIN) 750 MG tablet, TAKE 1 TABLET BY MOUTH THREE TIMES A DAY, Disp: 90 tablet, Rfl: 1  •  oxybutynin XL (DITROPAN-XL) 10 MG 24 hr tablet, Take 1 tablet by mouth Daily., Disp: 90 tablet, Rfl: 1  •  promethazine-dextromethorphan (PROMETHAZINE-DM) 6.25-15 MG/5ML syrup, Take 5 mL by mouth 4 (Four) Times a Day As Needed for Cough., Disp: 240 mL, Rfl: 0  •  traZODone (DESYREL) 50 MG tablet, Take 1 tablet by mouth Every Night., Disp: 90 tablet, Rfl: 1    Allergies:   Allergies   Allergen Reactions   • Penicillins Hives       PHQ-2 Total Score:     PHQ-9 Total Score:       Physical Exam:  Vital Signs:   Vitals:    02/07/23 1123   Weight: 105 kg (232 lb)   Height: 170.2 cm (67\")     Body mass index is 36.34 kg/m².     Physical Exam  Vitals and nursing note reviewed.   Pulmonary:      Effort: Pulmonary effort is normal.   Neurological:      Mental Status: She is alert and oriented to person, place, and time.   Psychiatric:         " Mood and Affect: Mood normal.           Assessment/Plan:   Diagnoses and all orders for this visit:    1. Primary osteoarthritis involving multiple joints (Primary)  Assessment & Plan:  We discussed that autoimmune arthritis has been ruled out so she most likely has osteoarthritis which will continue to be progressive.  She is going to try meloxicam 7.5 mg daily as needed for joint pain.  Side effects discussed    Orders:  -     CBC Auto Differential; Future  -     Comprehensive Metabolic Panel; Future  -     meloxicam (MOBIC) 7.5 MG tablet; Take 1 tablet by mouth Daily.  Dispense: 30 tablet; Refill: 5    2. Elevated uric acid in blood  Assessment & Plan:  We will go ahead and recheck uric acid level to rule out gout as a contributing factor    Orders:  -     Uric acid; Future         Follow Up:   No follow-ups on file.    Aspen Basurto DO  [facility] [place] [place] [facility]

## 2023-02-07 NOTE — ASSESSMENT & PLAN NOTE
We discussed that autoimmune arthritis has been ruled out so she most likely has osteoarthritis which will continue to be progressive.  She is going to try meloxicam 7.5 mg daily as needed for joint pain.  Side effects discussed

## 2023-02-09 PROCEDURE — 36415 COLL VENOUS BLD VENIPUNCTURE: CPT | Performed by: FAMILY MEDICINE

## 2023-02-10 LAB
ALBUMIN SERPL-MCNC: 4.4 G/DL (ref 3.8–4.9)
ALBUMIN/GLOB SERPL: 1.8 {RATIO} (ref 1.2–2.2)
ALP SERPL-CCNC: 137 IU/L (ref 44–121)
ALT SERPL-CCNC: 26 IU/L (ref 0–32)
AST SERPL-CCNC: 23 IU/L (ref 0–40)
BASOPHILS # BLD AUTO: 0 X10E3/UL (ref 0–0.2)
BASOPHILS NFR BLD AUTO: 1 %
BILIRUB SERPL-MCNC: <0.2 MG/DL (ref 0–1.2)
BUN SERPL-MCNC: 15 MG/DL (ref 6–24)
BUN/CREAT SERPL: 13 (ref 9–23)
CALCIUM SERPL-MCNC: 10.1 MG/DL (ref 8.7–10.2)
CHLORIDE SERPL-SCNC: 104 MMOL/L (ref 96–106)
CO2 SERPL-SCNC: 22 MMOL/L (ref 20–29)
CREAT SERPL-MCNC: 1.13 MG/DL (ref 0.57–1)
EGFRCR SERPLBLD CKD-EPI 2021: 57 ML/MIN/1.73
EOSINOPHIL # BLD AUTO: 0.1 X10E3/UL (ref 0–0.4)
EOSINOPHIL NFR BLD AUTO: 2 %
ERYTHROCYTE [DISTWIDTH] IN BLOOD BY AUTOMATED COUNT: 13.4 % (ref 11.7–15.4)
GLOBULIN SER CALC-MCNC: 2.5 G/DL (ref 1.5–4.5)
GLUCOSE SERPL-MCNC: 67 MG/DL (ref 70–99)
HCT VFR BLD AUTO: 40 % (ref 34–46.6)
HGB BLD-MCNC: 13 G/DL (ref 11.1–15.9)
IMM GRANULOCYTES # BLD AUTO: 0 X10E3/UL (ref 0–0.1)
IMM GRANULOCYTES NFR BLD AUTO: 0 %
LYMPHOCYTES # BLD AUTO: 2.5 X10E3/UL (ref 0.7–3.1)
LYMPHOCYTES NFR BLD AUTO: 34 %
MCH RBC QN AUTO: 28.6 PG (ref 26.6–33)
MCHC RBC AUTO-ENTMCNC: 32.5 G/DL (ref 31.5–35.7)
MCV RBC AUTO: 88 FL (ref 79–97)
MONOCYTES # BLD AUTO: 0.7 X10E3/UL (ref 0.1–0.9)
MONOCYTES NFR BLD AUTO: 9 %
NEUTROPHILS # BLD AUTO: 4.1 X10E3/UL (ref 1.4–7)
NEUTROPHILS NFR BLD AUTO: 54 %
PLATELET # BLD AUTO: 343 X10E3/UL (ref 150–450)
POTASSIUM SERPL-SCNC: 4.5 MMOL/L (ref 3.5–5.2)
PROT SERPL-MCNC: 6.9 G/DL (ref 6–8.5)
RBC # BLD AUTO: 4.54 X10E6/UL (ref 3.77–5.28)
SODIUM SERPL-SCNC: 141 MMOL/L (ref 134–144)
URATE SERPL-MCNC: 7 MG/DL (ref 3–7.2)
WBC # BLD AUTO: 7.5 X10E3/UL (ref 3.4–10.8)

## 2023-02-13 ENCOUNTER — TELEPHONE (OUTPATIENT)
Dept: FAMILY MEDICINE CLINIC | Facility: CLINIC | Age: 58
End: 2023-02-13

## 2023-02-13 NOTE — TELEPHONE ENCOUNTER
Caller: Toma Martinez    Relationship: Self    Best call back number: [phone]    Caller requesting test results: PATIENT    What test was performed: BLOODWORK    When was the test performed: 02.09.23    Where was the test performed: [facility]    Additional notes: PLEASE ADVISE. PATIENT WOULD LIKE TO DISCUSS RESULTS AS SOON AS POSSIBLE

## 2023-02-14 NOTE — TELEPHONE ENCOUNTER
Called patient and gave lab results, she said she was supposed to have labs for arthritis? Please advise

## 2023-03-15 RX ORDER — BUSPIRONE HYDROCHLORIDE 15 MG/1
TABLET ORAL
Qty: 30 TABLET | Refills: 2 | Status: SHIPPED | OUTPATIENT
Start: 2023-03-15

## 2023-05-15 RX ORDER — LISINOPRIL 20 MG/1
TABLET ORAL
Qty: 90 TABLET | Refills: 1 | Status: SHIPPED | OUTPATIENT
Start: 2023-05-15

## 2023-05-15 RX ORDER — TRAZODONE HYDROCHLORIDE 50 MG/1
TABLET ORAL
Qty: 90 TABLET | Refills: 1 | Status: SHIPPED | OUTPATIENT
Start: 2023-05-15

## 2023-05-15 RX ORDER — OXYBUTYNIN CHLORIDE 10 MG/1
TABLET, EXTENDED RELEASE ORAL
Qty: 90 TABLET | Refills: 1 | Status: SHIPPED | OUTPATIENT
Start: 2023-05-15

## 2023-07-07 PROBLEM — L29.9 PRURITUS: Status: ACTIVE | Noted: 2023-07-07

## 2023-08-18 ENCOUNTER — TRANSCRIBE ORDERS (OUTPATIENT)
Dept: NUTRITION | Facility: HOSPITAL | Age: 58
End: 2023-08-18
Payer: COMMERCIAL

## 2023-08-18 DIAGNOSIS — R63.5 ABNORMAL WEIGHT GAIN: Primary | ICD-10-CM

## 2023-09-11 RX ORDER — LEVOTHYROXINE SODIUM 0.07 MG/1
TABLET ORAL
Qty: 90 TABLET | Refills: 1 | Status: SHIPPED | OUTPATIENT
Start: 2023-09-11

## 2023-09-15 ENCOUNTER — LAB (OUTPATIENT)
Dept: LAB | Facility: HOSPITAL | Age: 58
End: 2023-09-15
Payer: COMMERCIAL

## 2023-09-15 ENCOUNTER — TRANSCRIBE ORDERS (OUTPATIENT)
Dept: LAB | Facility: HOSPITAL | Age: 58
End: 2023-09-15
Payer: COMMERCIAL

## 2023-09-15 DIAGNOSIS — R63.5 ABNORMAL WEIGHT GAIN: ICD-10-CM

## 2023-09-15 DIAGNOSIS — R63.5 ABNORMAL WEIGHT GAIN: Primary | ICD-10-CM

## 2023-09-15 LAB
ALBUMIN SERPL-MCNC: 4.8 G/DL (ref 3.5–5.2)
ALBUMIN/GLOB SERPL: 1.7 G/DL
ALP SERPL-CCNC: 129 U/L (ref 39–117)
ALT SERPL W P-5'-P-CCNC: 18 U/L (ref 1–33)
ANION GAP SERPL CALCULATED.3IONS-SCNC: 10.7 MMOL/L (ref 5–15)
AST SERPL-CCNC: 14 U/L (ref 1–32)
BASOPHILS # BLD AUTO: 0.05 10*3/MM3 (ref 0–0.2)
BASOPHILS NFR BLD AUTO: 0.8 % (ref 0–1.5)
BILIRUB SERPL-MCNC: 0.3 MG/DL (ref 0–1.2)
BUN SERPL-MCNC: 16 MG/DL (ref 6–20)
BUN/CREAT SERPL: 13.4 (ref 7–25)
CALCIUM SPEC-SCNC: 10.4 MG/DL (ref 8.6–10.5)
CHLORIDE SERPL-SCNC: 105 MMOL/L (ref 98–107)
CHOLEST SERPL-MCNC: 172 MG/DL (ref 0–200)
CO2 SERPL-SCNC: 26.3 MMOL/L (ref 22–29)
CREAT SERPL-MCNC: 1.19 MG/DL (ref 0.57–1)
DEPRECATED RDW RBC AUTO: 41.2 FL (ref 37–54)
EGFRCR SERPLBLD CKD-EPI 2021: 53.1 ML/MIN/1.73
EOSINOPHIL # BLD AUTO: 0.09 10*3/MM3 (ref 0–0.4)
EOSINOPHIL NFR BLD AUTO: 1.4 % (ref 0.3–6.2)
ERYTHROCYTE [DISTWIDTH] IN BLOOD BY AUTOMATED COUNT: 13 % (ref 12.3–15.4)
GLOBULIN UR ELPH-MCNC: 2.9 GM/DL
GLUCOSE SERPL-MCNC: 87 MG/DL (ref 65–99)
HBA1C MFR BLD: 5.6 % (ref 4.8–5.6)
HCT VFR BLD AUTO: 41.2 % (ref 34–46.6)
HDLC SERPL QL: 3.31
HDLC SERPL-MCNC: 52 MG/DL (ref 40–60)
HGB BLD-MCNC: 13.9 G/DL (ref 12–15.9)
IMM GRANULOCYTES # BLD AUTO: 0.02 10*3/MM3 (ref 0–0.05)
IMM GRANULOCYTES NFR BLD AUTO: 0.3 % (ref 0–0.5)
LDLC SERPL CALC-MCNC: 90 MG/DL (ref 0–100)
LYMPHOCYTES # BLD AUTO: 2.33 10*3/MM3 (ref 0.7–3.1)
LYMPHOCYTES NFR BLD AUTO: 36.1 % (ref 19.6–45.3)
MCH RBC QN AUTO: 29.3 PG (ref 26.6–33)
MCHC RBC AUTO-ENTMCNC: 33.7 G/DL (ref 31.5–35.7)
MCV RBC AUTO: 86.9 FL (ref 79–97)
MONOCYTES # BLD AUTO: 0.5 10*3/MM3 (ref 0.1–0.9)
MONOCYTES NFR BLD AUTO: 7.8 % (ref 5–12)
NEUTROPHILS NFR BLD AUTO: 3.46 10*3/MM3 (ref 1.7–7)
NEUTROPHILS NFR BLD AUTO: 53.6 % (ref 42.7–76)
NRBC BLD AUTO-RTO: 0 /100 WBC (ref 0–0.2)
PLATELET # BLD AUTO: 361 10*3/MM3 (ref 140–450)
PMV BLD AUTO: 10.2 FL (ref 6–12)
POTASSIUM SERPL-SCNC: 4.6 MMOL/L (ref 3.5–5.2)
PROT SERPL-MCNC: 7.7 G/DL (ref 6–8.5)
RBC # BLD AUTO: 4.74 10*6/MM3 (ref 3.77–5.28)
SODIUM SERPL-SCNC: 142 MMOL/L (ref 136–145)
T4 FREE SERPL-MCNC: 1.22 NG/DL (ref 0.93–1.7)
TRIGL SERPL-MCNC: 178 MG/DL (ref 0–150)
TSH SERPL DL<=0.05 MIU/L-ACNC: 4.83 UIU/ML (ref 0.27–4.2)
VLDLC SERPL-MCNC: 30 MG/DL (ref 5–40)
WBC NRBC COR # BLD: 6.45 10*3/MM3 (ref 3.4–10.8)

## 2023-09-15 PROCEDURE — 36415 COLL VENOUS BLD VENIPUNCTURE: CPT

## 2023-09-15 PROCEDURE — 80053 COMPREHEN METABOLIC PANEL: CPT

## 2023-09-15 PROCEDURE — 85025 COMPLETE CBC W/AUTO DIFF WBC: CPT

## 2023-09-15 PROCEDURE — 80061 LIPID PANEL: CPT

## 2023-09-15 PROCEDURE — 84443 ASSAY THYROID STIM HORMONE: CPT

## 2023-09-15 PROCEDURE — 83036 HEMOGLOBIN GLYCOSYLATED A1C: CPT

## 2023-09-15 PROCEDURE — 84439 ASSAY OF FREE THYROXINE: CPT

## 2023-10-06 ENCOUNTER — HOSPITAL ENCOUNTER (OUTPATIENT)
Dept: NUTRITION | Facility: HOSPITAL | Age: 58
Setting detail: RECURRING SERIES
Discharge: HOME | End: 2023-10-06

## 2023-10-06 PROCEDURE — 97802 MEDICAL NUTRITION INDIV IN: CPT

## 2023-10-06 NOTE — CONSULTS
[facility] Nutrition [facility]          Initial 60 Minute Nutrition Visit    Date: 10/06/2023   Patient Name: Toma Martinez  : 1965   MRN: [id number]   Referring Provider: Maame Westbrook A*    Reason for Visit: Weight management  Visit Format: Zoom    Nutrition Assessment       Social History:   Social History     Socioeconomic History    Marital status:    Tobacco Use    Smoking status: Never    Smokeless tobacco: Never   Vaping Use    Vaping Use: Never used   Substance and Sexual Activity    Alcohol use: Never    Drug use: Never    Sexual activity: Defer     Active Problem List:   Patient Active Problem List    Diagnosis     Pruritus [L29.9]     Primary osteoarthritis involving multiple joints [M15.9]     Elevated uric acid in blood [E79.0]     Encounter for wellness examination in adult [Z00.00]     Mild episode of recurrent major depressive disorder [F33.0]     YUMIKO (generalized anxiety disorder) [F41.1]     Acquired hypothyroidism [E03.9]     Primary hypertension [I10]     Mixed stress and urge urinary incontinence [N39.46]     Morbid obesity [E66.01]     Stage 3a chronic kidney disease [N18.31]     Acute cough [R05.1]     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, U09.9]     Depressive disorder [F32.A]       Current Medications:   Current Outpatient Medications:     buPROPion XL (Wellbutrin XL) 300 MG 24 hr tablet, Take 1 tablet by mouth Every Morning., Disp: 90 tablet, Rfl: 1    busPIRone (BUSPAR) 15 MG tablet, TAKE 0.5 TABLETS BY MOUTH 2 TIMES A DAY., Disp: 30 tablet, Rfl: 2    levothyroxine (SYNTHROID, LEVOTHROID) 75 MCG tablet, TAKE 1 TABLET BY MOUTH EVERY DAY, Disp: 90 tablet, Rfl: 1    lisinopril (PRINIVIL,ZESTRIL) 20 MG tablet, TAKE 1 TABLET BY MOUTH EVERY DAY, Disp: 90 tablet, Rfl: 1    meloxicam (MOBIC) 7.5 MG tablet, Take 1 tablet by mouth Daily., Disp: 30 tablet, Rfl: 5    metFORMIN ER (GLUCOPHAGE-XR) 500 MG 24 hr tablet, Take 2 tablets by mouth Daily With Breakfast., Disp:  ", Rfl:     methocarbamol (ROBAXIN) 750 MG tablet, TAKE 1 TABLET BY MOUTH THREE TIMES A DAY, Disp: 90 tablet, Rfl: 1    mirtazapine (REMERON) 7.5 MG tablet, Take 1 tablet by mouth Every Night., Disp: 30 tablet, Rfl: 5    oxybutynin XL (DITROPAN-XL) 10 MG 24 hr tablet, TAKE 1 TABLET BY MOUTH EVERY DAY, Disp: 90 tablet, Rfl: 1    phentermine (ADIPEX-P) 37.5 MG tablet, Take 1 tablet by mouth Daily., Disp: , Rfl:     traZODone (DESYREL) 50 MG tablet, TAKE 1 TABLET BY MOUTH EVERY DAY AT NIGHT, Disp: 90 tablet, Rfl: 1    Labs: , TSH 4.8    Hunger Vital Sign Food Insecurity Assessment:  Within the past 12 months I/we worried whether our food would run out before I/we got money to buy more: No   Within the past 12 months the food I/we bought just didn't last and I/we didn't have money to get more: NO   Use of food assistance programs (WIC, food stamps, food [place]) No       Food & Nutrition Related History       Food Allergies: fish  Food Intolerances: None  Food Behavior: Usually skips breakfast, sometimes lunch, \"picky eater\"  Nutrition Impact Symptoms: diarrhea occasionally, sometimes reflux  Gastrointestinal conditions that impact intake or food choices: None  Details at home: Lives with   Who prepares most meals: Self/  Who does grocery shopping: Self/  How many meals are purchased from fast food/sit down restaurants per week: 1-2  Difficulty chewin - Normal  Difficulty swallowin - Normal  Diet requirement related to personal preference or cultural belief: none  History of eating disorder/disordered eating habits: None  Language/communication details: English  Barriers to learning: None    24 Hour Recall:   Time Food/beverages consumed   Breakfast Usually skips, sometimes granola bar    Coffee   Lunch Chicken salad with crackers   Dinner Chicken ranch pizza - 2 slices   Beverages Water, coffee, 1-2 glasses sweet tea                 Additional comments: Patient usually skips breakfast. " "She says she eats small portion sizes and sometimes skips lunch.    Anthropometrics      Height:   Ht Readings from Last 1 Encounters:   07/07/23 170.2 cm (67\")     Weight:   Wt Readings from Last 3 Encounters:   07/07/23 107 kg (236 lb 6.4 oz)   02/07/23 105 kg (232 lb)   01/06/23 110 kg (243 lb)     BMI: There is no height or weight on file to calculate BMI.   Weight Change: Patient reports that she has lost 28-30# x1 year but has had issues losing more     Physical Activity     Barriers to physical activity: Arthritis and joint pain     Physical activity comments: Patient does not walk or exercise regularly due to pain    Estimated Needs     Estimated Energy Needs: 9053-7239 kcal    Estimated Protein Needs: Encouraged a protein with each meal     Estimated Fluid Needs: At least 64 oz water/day     Discussion / Education      Patient was referred for weight management. Her primary concern was difficulty losing weight despite eating small portions and skipping meals. Her main motivation is to feel better and prevent heart issues later (family history). She has not spoken to a dietitian before. She is on Phentermine and has recently started Metformin for weight loss. She reports that she has tried Wegovy before but insurance will not cover and she cannot pay the high out of pocket cost. She has tried low carb/high protein diets before but had issues with her kidneys. She avoids bread but still eats pasta, rice, potatoes, etc. She drinks 1-2 glasses sweet tea a day and 1-2 bottles of water. She is not physically active due to joint pain. She is a self-proclaimed \"picky eater\" and also has an allergy to fish. She lives with her  and works a desk job 4 days/week. She usually skips breakfast and occasionally skips lunch. She eats out 1-2 times/week.    RD provided education about the three macronutrients and the roles of each for promoting good health and balance. Patient was able to identify several foods in " "each category that they consume regularly. RD emphasized the importance of incorporating a variety of sources for each, as well as increasing fiber rich choices such as whole grains and vegetables/fruits to improve blood sugar, cholesterol, and satiety. RD reviewed the different types of dietary fat and the roles of each in managing cholesterol and reducing risks of heart disease and other complications. RD provided a visual of a \"balanced meal\" with adequate portions of carbohydrates, protein, and fruits/vegetables. Patient compared the visual to their own meals and was able to identify some areas for improved balance and portion sizes. RD also discussed the importance of eating a protein and fiber rich breakfast to improve energy levels and blood glucose throughout the rest of the day. Patient reported that she does not like most breakfast foods so RD encouraged her to eat other foods for breakfast, such as leftovers, snack foods. Patient requested to speak about lunch and dinner more at her next appointment. RD discussed the importance of eating regularly throughout the day and drinking adequate water. RD encouraged patient to find a water bottle she liked and would use regularly. RD also suggested keeping several snack options at her desk for days when she does not have time for a meal. Patient was able to suggest some snack ideas with protein that she would like to try. RD encouraged patient to reach out with any additional questions or concerns.    Assessment of patient engagement: Engaged    Measurement of understanding: Patient verbalized understanding, Patient able to demonstrate understanding with teach back     Resources Provided:   Weight management packet    Goal (s)      Goal 1: Increase water intake to 64-80 oz/day.     Goal 2: Eat breakfast 4-5 days/week.     Goal 3: Keep protein rich snack options at desk at work.     Plan of Care     PES Statement:   Inadequate oral intake related to skipping meals " and not eating balanced meals as evidenced by dietary recall and interview.     Follow Up Visit      Follow Up Scheduled for November 16 at 1 pm via Zoom.    Total of 60 minutes spent with patient on nutrition counseling. Education based on Academy of Nutrition and Dietetics guidelines. Patient was provided with RD's contact information. Thank you for this referral.

## 2023-10-20 DIAGNOSIS — M15.9 PRIMARY OSTEOARTHRITIS INVOLVING MULTIPLE JOINTS: ICD-10-CM

## 2023-10-20 RX ORDER — MELOXICAM 7.5 MG/1
7.5 TABLET ORAL DAILY
Qty: 30 TABLET | Refills: 5 | Status: SHIPPED | OUTPATIENT
Start: 2023-10-20

## 2024-01-05 ENCOUNTER — OFFICE VISIT (OUTPATIENT)
Dept: FAMILY MEDICINE CLINIC | Facility: CLINIC | Age: 59
End: 2024-01-05
Payer: COMMERCIAL

## 2024-01-05 VITALS
OXYGEN SATURATION: 97 % | DIASTOLIC BLOOD PRESSURE: 86 MMHG | WEIGHT: 236 LBS | HEART RATE: 84 BPM | BODY MASS INDEX: 37.04 KG/M2 | HEIGHT: 67 IN | SYSTOLIC BLOOD PRESSURE: 126 MMHG

## 2024-01-05 DIAGNOSIS — I10 PRIMARY HYPERTENSION: Primary | ICD-10-CM

## 2024-01-05 DIAGNOSIS — E03.9 ACQUIRED HYPOTHYROIDISM: ICD-10-CM

## 2024-01-05 DIAGNOSIS — E11.9 TYPE 2 DIABETES MELLITUS WITHOUT COMPLICATION, WITHOUT LONG-TERM CURRENT USE OF INSULIN: ICD-10-CM

## 2024-01-05 DIAGNOSIS — E66.01 MORBID OBESITY: ICD-10-CM

## 2024-01-05 DIAGNOSIS — F33.0 MILD EPISODE OF RECURRENT MAJOR DEPRESSIVE DISORDER: ICD-10-CM

## 2024-01-05 DIAGNOSIS — N39.46 MIXED STRESS AND URGE URINARY INCONTINENCE: ICD-10-CM

## 2024-01-05 RX ORDER — BUSPIRONE HYDROCHLORIDE 15 MG/1
7.5 TABLET ORAL 2 TIMES DAILY
Qty: 180 TABLET | Refills: 1 | Status: SHIPPED | OUTPATIENT
Start: 2024-01-05

## 2024-01-05 RX ORDER — BUPROPION HYDROCHLORIDE 300 MG/1
300 TABLET ORAL EVERY MORNING
Qty: 90 TABLET | Refills: 1 | Status: SHIPPED | OUTPATIENT
Start: 2024-01-05

## 2024-01-05 RX ORDER — LISINOPRIL 20 MG/1
20 TABLET ORAL DAILY
Qty: 90 TABLET | Refills: 1 | Status: SHIPPED | OUTPATIENT
Start: 2024-01-05

## 2024-01-05 RX ORDER — METHOCARBAMOL 750 MG/1
750 TABLET, FILM COATED ORAL 3 TIMES DAILY
Qty: 90 TABLET | Refills: 1 | Status: SHIPPED | OUTPATIENT
Start: 2024-01-05

## 2024-01-05 RX ORDER — OXYBUTYNIN CHLORIDE 10 MG/1
10 TABLET, EXTENDED RELEASE ORAL DAILY
Qty: 90 TABLET | Refills: 1 | Status: SHIPPED | OUTPATIENT
Start: 2024-01-05

## 2024-01-05 RX ORDER — TRAZODONE HYDROCHLORIDE 50 MG/1
50 TABLET ORAL
Qty: 90 TABLET | Refills: 1 | Status: SHIPPED | OUTPATIENT
Start: 2024-01-05 | End: 2024-01-05

## 2024-01-05 RX ORDER — LEVOTHYROXINE SODIUM 0.07 MG/1
75 TABLET ORAL DAILY
Qty: 90 TABLET | Refills: 1 | Status: SHIPPED | OUTPATIENT
Start: 2024-01-05

## 2024-01-05 NOTE — PROGRESS NOTES
Date: 2024   Patient Name: Toma Martinez  : 1965   MRN: [id number]     Chief Complaint:    Chief Complaint   Patient presents with    Hypertension    Depression    Diabetes       History of Present Illness: Toma Martinez is a 58 y.o. female who is here today to follow up for hypertension, depression, hypothyroidism, and diabetes.  She is due for repeat lab work and medication refills today.         Review of Systems:   Review of Systems   Constitutional:  Negative for chills, fatigue and fever.   Respiratory:  Negative for cough and shortness of breath.    Cardiovascular:  Negative for chest pain and palpitations.   Gastrointestinal:  Negative for abdominal pain, constipation, diarrhea, nausea and vomiting.   Musculoskeletal:  Negative for back pain and myalgias.   Neurological:  Negative for dizziness and headache.   Psychiatric/Behavioral:  Negative for depressed mood. The patient is not nervous/anxious.        Past Medical History:   Past Medical History:   Diagnosis Date    Breast cyst, left     Cancer 10/20/2020    Skin cancer    Cholelithiasis     Depression     DRUG THERAPY    Depressive disorder     Hypertension     Tonsillitis        Past Surgical History:   Past Surgical History:   Procedure Laterality Date    BREAST CYST EXCISION Left     BENIGN    CHOLECYSTECTOMY  2009    HYSTERECTOMY  2003    TONSILLECTOMY         Family History:   Family History   Problem Relation Age of Onset    Hypertension Mother     Clotting disorder Father         BLOOD CLOT    Coronary artery disease Maternal Grandmother     Coronary artery disease Maternal Grandfather     Stroke Paternal Grandmother     Diabetes Sister        Social History:   Social History     Socioeconomic History    Marital status:    Tobacco Use    Smoking status: Never    Smokeless tobacco: Never   Vaping Use    Vaping Use: Never used   Substance and Sexual Activity    Alcohol use: Never    Drug use: Never    Sexual  "activity: Defer       Medications:     Current Outpatient Medications:     buPROPion XL (Wellbutrin XL) 300 MG 24 hr tablet, Take 1 tablet by mouth Every Morning., Disp: 90 tablet, Rfl: 1    busPIRone (BUSPAR) 15 MG tablet, Take 0.5 tablets by mouth 2 (Two) Times a Day., Disp: 180 tablet, Rfl: 1    levothyroxine (SYNTHROID, LEVOTHROID) 75 MCG tablet, Take 1 tablet by mouth Daily., Disp: 90 tablet, Rfl: 1    lisinopril (PRINIVIL,ZESTRIL) 20 MG tablet, Take 1 tablet by mouth Daily., Disp: 90 tablet, Rfl: 1    meloxicam (MOBIC) 7.5 MG tablet, TAKE 1 TABLET BY MOUTH EVERY DAY, Disp: 30 tablet, Rfl: 5    metFORMIN ER (GLUCOPHAGE-XR) 500 MG 24 hr tablet, Take 2 tablets by mouth Daily With Breakfast., Disp: , Rfl:     methocarbamol (ROBAXIN) 750 MG tablet, Take 1 tablet by mouth 3 (Three) Times a Day., Disp: 90 tablet, Rfl: 1    mirtazapine (REMERON) 7.5 MG tablet, Take 1 tablet by mouth Every Night., Disp: 30 tablet, Rfl: 5    oxybutynin XL (DITROPAN-XL) 10 MG 24 hr tablet, Take 1 tablet by mouth Daily., Disp: 90 tablet, Rfl: 1    phentermine (ADIPEX-P) 37.5 MG tablet, Take 1 tablet by mouth Daily., Disp: , Rfl:     Allergies:   Allergies   Allergen Reactions    Penicillins Hives       PHQ-2 Total Score: 0   PHQ-9 Total Score: 0     Physical Exam:  Vital Signs:   Vitals:    01/05/24 0916 01/05/24 1505   BP: 130/90 126/86   BP Location: Left arm    Patient Position: Sitting    Cuff Size: Large Adult    Pulse: 84    SpO2: 97%    Weight: 107 kg (236 lb)    Height: 170.2 cm (67\")      Body mass index is 36.96 kg/m².     Physical Exam  Vitals and nursing note reviewed.   Constitutional:       Appearance: Normal appearance. She is obese.   Cardiovascular:      Rate and Rhythm: Normal rate and regular rhythm.      Heart sounds: Normal heart sounds. No murmur heard.  Pulmonary:      Effort: Pulmonary effort is normal.      Breath sounds: Normal breath sounds. No wheezing.   Neurological:      Mental Status: She is alert and " oriented to person, place, and time. Mental status is at baseline.   Psychiatric:         Mood and Affect: Mood normal.         Behavior: Behavior normal.           Assessment/Plan:   Diagnoses and all orders for this visit:    1. Primary hypertension (Primary)  Assessment & Plan:  Hypertension is unchanged.  Continue current treatment regimen.  Weight loss.  Regular aerobic exercise.  Blood pressure will be reassessed at the next regular appointment.    Orders:  -     lisinopril (PRINIVIL,ZESTRIL) 20 MG tablet; Take 1 tablet by mouth Daily.  Dispense: 90 tablet; Refill: 1  -     CBC Auto Differential; Future  -     Comprehensive Metabolic Panel; Future  -     CBC Auto Differential  -     Comprehensive Metabolic Panel    2. Mild episode of recurrent major depressive disorder  Assessment & Plan:  Patient's depression is recurrent and is mild without psychosis. Their depression is currently active and the condition is unchanged. This will be reassessed at the next regular appointment. F/U as described:patient will continue current medication therapy.    Orders:  -     buPROPion XL (Wellbutrin XL) 300 MG 24 hr tablet; Take 1 tablet by mouth Every Morning.  Dispense: 90 tablet; Refill: 1  -     busPIRone (BUSPAR) 15 MG tablet; Take 0.5 tablets by mouth 2 (Two) Times a Day.  Dispense: 180 tablet; Refill: 1    3. Acquired hypothyroidism  Assessment & Plan:  Stable, labs today    Orders:  -     levothyroxine (SYNTHROID, LEVOTHROID) 75 MCG tablet; Take 1 tablet by mouth Daily.  Dispense: 90 tablet; Refill: 1  -     TSH; Future  -     TSH    4. Mixed stress and urge urinary incontinence  Assessment & Plan:  Stable on oxybutynin    Orders:  -     oxybutynin XL (DITROPAN-XL) 10 MG 24 hr tablet; Take 1 tablet by mouth Daily.  Dispense: 90 tablet; Refill: 1    5. Type 2 diabetes mellitus without complication, without long-term current use of insulin  Assessment & Plan:  Diabetes is  stable .   Continue current treatment  regimen.  Dietary recommendations for ADA diet.  Regular aerobic exercise.  Diabetes will be reassessed in 6 months.    Orders:  -     Hemoglobin A1c; Future  -     Lipid Panel; Future  -     Hemoglobin A1c  -     Lipid Panel    6. Morbid obesity  Assessment & Plan:  Patient's (Body mass index is 36.96 kg/m².) indicates that they are morbidly/severely obese (BMI > 40 or > 35 with obesity - related health condition) with health conditions that include hypertension . Weight is unchanged. BMI is is above average; BMI management plan is completed. We discussed portion control and increasing exercise.       Other orders  -     methocarbamol (ROBAXIN) 750 MG tablet; Take 1 tablet by mouth 3 (Three) Times a Day.  Dispense: 90 tablet; Refill: 1  -     Discontinue: traZODone (DESYREL) 50 MG tablet; Take 1 tablet by mouth every night at bedtime.  Dispense: 90 tablet; Refill: 1           Follow Up:   Return in about 6 months (around 7/5/2024) for Annual physical.    Aspen Basurto, DO  [facility] [place] [facility] [place] [facility]

## 2024-01-05 NOTE — ASSESSMENT & PLAN NOTE
Patient's (Body mass index is 36.96 kg/m².) indicates that they are morbidly/severely obese (BMI > 40 or > 35 with obesity - related health condition) with health conditions that include hypertension . Weight is unchanged. BMI is is above average; BMI management plan is completed. We discussed portion control and increasing exercise.

## 2024-01-05 NOTE — ASSESSMENT & PLAN NOTE
Diabetes is  stable .   Continue current treatment regimen.  Dietary recommendations for ADA diet.  Regular aerobic exercise.  Diabetes will be reassessed in 6 months.

## 2024-01-06 LAB
ALBUMIN SERPL-MCNC: 4.6 G/DL (ref 3.8–4.9)
ALBUMIN/GLOB SERPL: 1.8 {RATIO} (ref 1.2–2.2)
ALP SERPL-CCNC: 128 IU/L (ref 44–121)
ALT SERPL-CCNC: 22 IU/L (ref 0–32)
AST SERPL-CCNC: 23 IU/L (ref 0–40)
BASOPHILS # BLD AUTO: 0.1 X10E3/UL (ref 0–0.2)
BASOPHILS NFR BLD AUTO: 1 %
BILIRUB SERPL-MCNC: 0.3 MG/DL (ref 0–1.2)
BUN SERPL-MCNC: 23 MG/DL (ref 6–24)
BUN/CREAT SERPL: 17 (ref 9–23)
CALCIUM SERPL-MCNC: 9.6 MG/DL (ref 8.7–10.2)
CHLORIDE SERPL-SCNC: 102 MMOL/L (ref 96–106)
CHOLEST SERPL-MCNC: 193 MG/DL (ref 100–199)
CO2 SERPL-SCNC: 21 MMOL/L (ref 20–29)
CREAT SERPL-MCNC: 1.35 MG/DL (ref 0.57–1)
EGFRCR SERPLBLD CKD-EPI 2021: 46 ML/MIN/1.73
EOSINOPHIL # BLD AUTO: 0.1 X10E3/UL (ref 0–0.4)
EOSINOPHIL NFR BLD AUTO: 2 %
ERYTHROCYTE [DISTWIDTH] IN BLOOD BY AUTOMATED COUNT: 13 % (ref 11.7–15.4)
GLOBULIN SER CALC-MCNC: 2.5 G/DL (ref 1.5–4.5)
GLUCOSE SERPL-MCNC: 84 MG/DL (ref 70–99)
HBA1C MFR BLD: 5.8 % (ref 4.8–5.6)
HCT VFR BLD AUTO: 42.9 % (ref 34–46.6)
HDLC SERPL-MCNC: 59 MG/DL
HGB BLD-MCNC: 14 G/DL (ref 11.1–15.9)
IMM GRANULOCYTES # BLD AUTO: 0 X10E3/UL (ref 0–0.1)
IMM GRANULOCYTES NFR BLD AUTO: 1 %
LDLC SERPL CALC-MCNC: 97 MG/DL (ref 0–99)
LYMPHOCYTES # BLD AUTO: 3.4 X10E3/UL (ref 0.7–3.1)
LYMPHOCYTES NFR BLD AUTO: 41 %
MCH RBC QN AUTO: 28.3 PG (ref 26.6–33)
MCHC RBC AUTO-ENTMCNC: 32.6 G/DL (ref 31.5–35.7)
MCV RBC AUTO: 87 FL (ref 79–97)
MONOCYTES # BLD AUTO: 0.6 X10E3/UL (ref 0.1–0.9)
MONOCYTES NFR BLD AUTO: 7 %
NEUTROPHILS # BLD AUTO: 4.1 X10E3/UL (ref 1.4–7)
NEUTROPHILS NFR BLD AUTO: 48 %
PLATELET # BLD AUTO: 418 X10E3/UL (ref 150–450)
POTASSIUM SERPL-SCNC: 4.8 MMOL/L (ref 3.5–5.2)
PROT SERPL-MCNC: 7.1 G/DL (ref 6–8.5)
RBC # BLD AUTO: 4.95 X10E6/UL (ref 3.77–5.28)
SODIUM SERPL-SCNC: 140 MMOL/L (ref 134–144)
TRIGL SERPL-MCNC: 218 MG/DL (ref 0–149)
TSH SERPL DL<=0.005 MIU/L-ACNC: 3.55 UIU/ML (ref 0.45–4.5)
VLDLC SERPL CALC-MCNC: 37 MG/DL (ref 5–40)
WBC # BLD AUTO: 8.4 X10E3/UL (ref 3.4–10.8)

## 2024-02-27 DIAGNOSIS — R94.4 ABNORMAL RENAL FUNCTION TEST: Primary | ICD-10-CM

## 2024-03-01 ENCOUNTER — LAB (OUTPATIENT)
Dept: FAMILY MEDICINE CLINIC | Facility: CLINIC | Age: 59
End: 2024-03-01
Payer: COMMERCIAL

## 2024-03-01 DIAGNOSIS — R94.4 ABNORMAL RENAL FUNCTION TEST: ICD-10-CM

## 2024-03-01 PROCEDURE — 36415 COLL VENOUS BLD VENIPUNCTURE: CPT | Performed by: FAMILY MEDICINE

## 2024-03-02 LAB
BUN SERPL-MCNC: 14 MG/DL (ref 6–24)
BUN/CREAT SERPL: 12 (ref 9–23)
CALCIUM SERPL-MCNC: 9.6 MG/DL (ref 8.7–10.2)
CHLORIDE SERPL-SCNC: 105 MMOL/L (ref 96–106)
CO2 SERPL-SCNC: 20 MMOL/L (ref 20–29)
CREAT SERPL-MCNC: 1.15 MG/DL (ref 0.57–1)
EGFRCR SERPLBLD CKD-EPI 2021: 55 ML/MIN/1.73
GLUCOSE SERPL-MCNC: 87 MG/DL (ref 70–99)
POTASSIUM SERPL-SCNC: 4.8 MMOL/L (ref 3.5–5.2)
SODIUM SERPL-SCNC: 140 MMOL/L (ref 134–144)

## 2024-03-27 RX ORDER — METHOCARBAMOL 750 MG/1
750 TABLET, FILM COATED ORAL 3 TIMES DAILY
Qty: 90 TABLET | Refills: 1 | Status: SHIPPED | OUTPATIENT
Start: 2024-03-27

## 2024-05-17 ENCOUNTER — OFFICE VISIT (OUTPATIENT)
Dept: FAMILY MEDICINE CLINIC | Facility: CLINIC | Age: 59
End: 2024-05-17
Payer: COMMERCIAL

## 2024-05-17 VITALS
BODY MASS INDEX: 35.67 KG/M2 | DIASTOLIC BLOOD PRESSURE: 82 MMHG | SYSTOLIC BLOOD PRESSURE: 110 MMHG | WEIGHT: 227.3 LBS | HEIGHT: 67 IN | HEART RATE: 96 BPM | OXYGEN SATURATION: 98 %

## 2024-05-17 DIAGNOSIS — Z00.00 ENCOUNTER FOR WELLNESS EXAMINATION IN ADULT: Primary | ICD-10-CM

## 2024-05-17 DIAGNOSIS — N18.31 STAGE 3A CHRONIC KIDNEY DISEASE: ICD-10-CM

## 2024-05-17 DIAGNOSIS — E03.9 ACQUIRED HYPOTHYROIDISM: ICD-10-CM

## 2024-05-17 DIAGNOSIS — E66.01 MORBID OBESITY: ICD-10-CM

## 2024-05-17 DIAGNOSIS — E11.9 TYPE 2 DIABETES MELLITUS WITHOUT COMPLICATION, WITHOUT LONG-TERM CURRENT USE OF INSULIN: ICD-10-CM

## 2024-05-17 DIAGNOSIS — I10 PRIMARY HYPERTENSION: ICD-10-CM

## 2024-05-17 LAB
EXPIRATION DATE: ABNORMAL
Lab: ABNORMAL
POC CREATININE URINE: 300
POC MICROALBUMIN URINE: 80

## 2024-05-17 RX ORDER — SEMAGLUTIDE 1.34 MG/ML
1 INJECTION, SOLUTION SUBCUTANEOUS WEEKLY
COMMUNITY
Start: 2024-05-10

## 2024-05-17 NOTE — PROGRESS NOTES
Patient Name: Toma Martinez  : 1965   MRN: [id number]     Chief Complaint:    Chief Complaint   Patient presents with    Annual Exam       History of Present Illness: Toma Martinez is a 59 y.o. female who is here today for their annual health maintenance and physical.  Chronic medical conditions include diabetes, hypothyroidism, anxiety and depression, urinary incontinence, CKD stage IIIa, hypertension, and osteoarthritis.  She is up-to-date on mammogram and colon screening.    She has been prescribed Ozempic by another women's health physician and has lost over 15 pounds.  She reports significant GI side effects when she moved up to the 1 mg dose including vomiting and diarrhea.           Review of Systems:   Review of Systems   Constitutional:  Negative for fever.   Respiratory:  Negative for cough and shortness of breath.    Cardiovascular:  Negative for chest pain.   Gastrointestinal:  Negative for abdominal pain.   Neurological:  Negative for dizziness and headache.   Psychiatric/Behavioral:  Negative for depressed mood. The patient is not nervous/anxious.        Past Medical History, Social History, Family History and Care Team were all reviewed with patient and updated as appropriate.     Medications:     Current Outpatient Medications:     busPIRone (BUSPAR) 15 MG tablet, Take 0.5 tablets by mouth 2 (Two) Times a Day., Disp: 180 tablet, Rfl: 1    levothyroxine (SYNTHROID, LEVOTHROID) 75 MCG tablet, Take 1 tablet by mouth Daily., Disp: 90 tablet, Rfl: 1    meloxicam (MOBIC) 7.5 MG tablet, TAKE 1 TABLET BY MOUTH EVERY DAY, Disp: 30 tablet, Rfl: 5    metFORMIN ER (GLUCOPHAGE-XR) 500 MG 24 hr tablet, Take 2 tablets by mouth Daily With Breakfast., Disp: , Rfl:     methocarbamol (ROBAXIN) 750 MG tablet, TAKE 1 TABLET BY MOUTH THREE TIMES A DAY, Disp: 90 tablet, Rfl: 1    mirtazapine (REMERON) 7.5 MG tablet, Take 1 tablet by mouth Every Night., Disp: 30 tablet, Rfl: 5    oxybutynin XL (DITROPAN-XL) 10  "MG 24 hr tablet, Take 1 tablet by mouth Daily., Disp: 90 tablet, Rfl: 1    Ozempic, 1 MG/DOSE, 4 MG/3ML solution pen-injector, Inject 1 mg under the skin into the appropriate area as directed 1 (One) Time Per Week., Disp: , Rfl:     Allergies:   Allergies   Allergen Reactions    Penicillins Hives         Depression: PHQ-2 Depression Screening  PHQ-2 Total Score:     PHQ-9 Total Score:       Intimate partner violence: (Screen on initial visit, pregnant women, women with injuries, older adult with injury or evidence of neglect):  Violence can be a problem in many people's lives, so I now ask every patient about trauma or abuse they may have experienced in a relationship.  Stress/Safety - Do you feel safe in your relationship?  Afraid/Abused - Have you ever been in a relationship where you were threatened, hurt, or afraid?  Friend/Family - Are your friends aware you have been hurt?  Emergency Plan - Do you have a safe place to go and the resources you need in an emergency?    Osteoporosis:   Ost menopausal women < 65 with RF (advancing age, previous fracture, glucocorticoid therapy, parental hip fracture, low body weight, current cigarette smoking, excessive alcohol consumption, rheumatoid arthritis, secondary osteoporosis [hypogonadism/premature menopause, malabsorption, chronic liver disease, IBD]).  All women 65 or older      Physical Exam:  Vital Signs:   Vitals:    05/17/24 0805   BP: 110/82   BP Location: Left arm   Patient Position: Sitting   Cuff Size: Large Adult   Pulse: 96   SpO2: 98%   Weight: 103 kg (227 lb 4.8 oz)   Height: 170.2 cm (67\")     Body mass index is 35.6 kg/m².     Physical Exam  Vitals and nursing note reviewed.   Constitutional:       Appearance: Normal appearance. She is obese.   HENT:      Right Ear: Tympanic membrane and ear canal normal.      Left Ear: Tympanic membrane and ear canal normal.      Mouth/Throat:      Mouth: Mucous membranes are moist.      Pharynx: Oropharynx is clear. "   Eyes:      Pupils: Pupils are equal, round, and reactive to light.   Cardiovascular:      Rate and Rhythm: Normal rate and regular rhythm.      Heart sounds: Normal heart sounds.   Pulmonary:      Effort: Pulmonary effort is normal.      Breath sounds: Normal breath sounds.   Abdominal:      General: Bowel sounds are normal.      Palpations: Abdomen is soft.      Tenderness: There is no abdominal tenderness.   Musculoskeletal:      Cervical back: Neck supple.   Lymphadenopathy:      Cervical: No cervical adenopathy.   Neurological:      Mental Status: She is alert and oriented to person, place, and time. Mental status is at baseline.   Psychiatric:         Mood and Affect: Mood normal.         Behavior: Behavior normal.         Procedures      Assessment/Plan:   Diagnoses and all orders for this visit:    1. Encounter for wellness examination in adult (Primary)  Assessment & Plan:  Fasting labs today, mammogram and Cologuard ordered    Orders:  -     Hemoglobin A1c; Future  -     CBC Auto Differential; Future  -     Comprehensive Metabolic Panel; Future  -     Lipid Panel; Future  -     TSH; Future  -     T4, Free; Future  -     Hemoglobin A1c  -     CBC Auto Differential  -     Comprehensive Metabolic Panel  -     Lipid Panel  -     TSH  -     T4, Free    2. Type 2 diabetes mellitus without complication, without long-term current use of insulin  Assessment & Plan:  Diabetes is improving with treatment.   Recommended a Mediterranean style of eating  Regular aerobic exercise.  Reminded to get yearly retinal exam.  We discussed possibly changing her Ozempic to Mounjaro due to significant GI side effects with the Ozempic.  She will discuss this with her other physician  Diabetes will be reassessed in 6 months    Orders:  -     POCT microalbumin    3. Primary hypertension  Assessment & Plan:  Hypertension is resolved after recent weight loss, no medication required at this time.      4. Acquired  hypothyroidism  Assessment & Plan:  Stable, labs today      5. Stage 3a chronic kidney disease  Assessment & Plan:  Renal condition is unchanged.  Continue current treatment regimen.  Renal condition will be reassessed in 6 months.      6. Morbid obesity  Assessment & Plan:  Patient's (Body mass index is 35.6 kg/m².) indicates that they are morbidly/severely obese (BMI > 40 or > 35 with obesity - related health condition) with health conditions that include hypertension . Weight is unchanged. BMI is is above average; BMI management plan is completed. We discussed portion control and increasing exercise.              Follow Up:   Return in about 6 months (around 11/17/2024) for Recheck with labs.    Healthcare Maintenance:   Counseling provided on Discussed injury prevention, diet and exercise, safe sexual practices, and screening for common diseases. Encouraged use of sunscreen and seatbelts. Encouraged SBE, avoidance of tobacco, limiting alcohol, and yearly dental and eye exams.   .   Toma Martinez voices understanding and acceptance of this advice and will call back with any further questions or concerns. AVS with preventive healthcare tips printed for patient.     Aspen Basurto DO  [facility]

## 2024-05-17 NOTE — ASSESSMENT & PLAN NOTE
Patient's (Body mass index is 35.6 kg/m².) indicates that they are morbidly/severely obese (BMI > 40 or > 35 with obesity - related health condition) with health conditions that include hypertension . Weight is unchanged. BMI is is above average; BMI management plan is completed. We discussed portion control and increasing exercise.

## 2024-05-17 NOTE — ASSESSMENT & PLAN NOTE
Diabetes is improving with treatment.   Recommended a Mediterranean style of eating  Regular aerobic exercise.  Reminded to get yearly retinal exam.  We discussed possibly changing her Ozempic to Mounjaro due to significant GI side effects with the Ozempic.  She will discuss this with her other physician  Diabetes will be reassessed in 6 months

## 2024-05-18 LAB
ALBUMIN SERPL-MCNC: 4.4 G/DL (ref 3.8–4.9)
ALBUMIN/GLOB SERPL: 1.9 {RATIO} (ref 1.2–2.2)
ALP SERPL-CCNC: 120 IU/L (ref 44–121)
ALT SERPL-CCNC: 15 IU/L (ref 0–32)
AST SERPL-CCNC: 16 IU/L (ref 0–40)
BASOPHILS # BLD AUTO: 0 X10E3/UL (ref 0–0.2)
BASOPHILS NFR BLD AUTO: 1 %
BILIRUB SERPL-MCNC: 0.3 MG/DL (ref 0–1.2)
BUN SERPL-MCNC: 14 MG/DL (ref 6–24)
BUN/CREAT SERPL: 12 (ref 9–23)
CALCIUM SERPL-MCNC: 9.4 MG/DL (ref 8.7–10.2)
CHLORIDE SERPL-SCNC: 106 MMOL/L (ref 96–106)
CHOLEST SERPL-MCNC: 154 MG/DL (ref 100–199)
CO2 SERPL-SCNC: 21 MMOL/L (ref 20–29)
CREAT SERPL-MCNC: 1.14 MG/DL (ref 0.57–1)
EGFRCR SERPLBLD CKD-EPI 2021: 55 ML/MIN/1.73
EOSINOPHIL # BLD AUTO: 0.1 X10E3/UL (ref 0–0.4)
EOSINOPHIL NFR BLD AUTO: 2 %
ERYTHROCYTE [DISTWIDTH] IN BLOOD BY AUTOMATED COUNT: 13.3 % (ref 11.7–15.4)
GLOBULIN SER CALC-MCNC: 2.3 G/DL (ref 1.5–4.5)
GLUCOSE SERPL-MCNC: 84 MG/DL (ref 70–99)
HBA1C MFR BLD: 5.5 % (ref 4.8–5.6)
HCT VFR BLD AUTO: 41.1 % (ref 34–46.6)
HDLC SERPL-MCNC: 46 MG/DL
HGB BLD-MCNC: 13.4 G/DL (ref 11.1–15.9)
IMM GRANULOCYTES # BLD AUTO: 0 X10E3/UL (ref 0–0.1)
IMM GRANULOCYTES NFR BLD AUTO: 0 %
LDLC SERPL CALC-MCNC: 80 MG/DL (ref 0–99)
LYMPHOCYTES # BLD AUTO: 1.8 X10E3/UL (ref 0.7–3.1)
LYMPHOCYTES NFR BLD AUTO: 32 %
MCH RBC QN AUTO: 28.6 PG (ref 26.6–33)
MCHC RBC AUTO-ENTMCNC: 32.6 G/DL (ref 31.5–35.7)
MCV RBC AUTO: 88 FL (ref 79–97)
MONOCYTES # BLD AUTO: 0.4 X10E3/UL (ref 0.1–0.9)
MONOCYTES NFR BLD AUTO: 7 %
NEUTROPHILS # BLD AUTO: 3.4 X10E3/UL (ref 1.4–7)
NEUTROPHILS NFR BLD AUTO: 58 %
PLATELET # BLD AUTO: 309 X10E3/UL (ref 150–450)
POTASSIUM SERPL-SCNC: 4.5 MMOL/L (ref 3.5–5.2)
PROT SERPL-MCNC: 6.7 G/DL (ref 6–8.5)
RBC # BLD AUTO: 4.69 X10E6/UL (ref 3.77–5.28)
SODIUM SERPL-SCNC: 140 MMOL/L (ref 134–144)
T4 FREE SERPL-MCNC: 1.32 NG/DL (ref 0.82–1.77)
TRIGL SERPL-MCNC: 164 MG/DL (ref 0–149)
TSH SERPL DL<=0.005 MIU/L-ACNC: 3.03 UIU/ML (ref 0.45–4.5)
VLDLC SERPL CALC-MCNC: 28 MG/DL (ref 5–40)
WBC # BLD AUTO: 5.7 X10E3/UL (ref 3.4–10.8)

## 2024-05-22 RX ORDER — METHOCARBAMOL 750 MG/1
750 TABLET, FILM COATED ORAL 3 TIMES DAILY
Qty: 90 TABLET | Refills: 1 | Status: SHIPPED | OUTPATIENT
Start: 2024-05-22

## 2024-05-24 ENCOUNTER — TELEPHONE (OUTPATIENT)
Dept: FAMILY MEDICINE CLINIC | Facility: CLINIC | Age: 59
End: 2024-05-24
Payer: COMMERCIAL

## 2024-05-24 NOTE — TELEPHONE ENCOUNTER
Patient called and wants to switch from Ozempic to Mounjaro, she stated you discussed this at her last appointment. Please advise.

## 2024-08-06 DIAGNOSIS — J44.9 CHRONIC OBSTRUCTIVE PULMONARY DISEASE, UNSPECIFIED COPD TYPE: Primary | ICD-10-CM

## 2024-11-02 DIAGNOSIS — M15.0 PRIMARY OSTEOARTHRITIS INVOLVING MULTIPLE JOINTS: ICD-10-CM

## 2024-11-04 RX ORDER — MELOXICAM 7.5 MG/1
7.5 TABLET ORAL DAILY
Qty: 30 TABLET | Refills: 5 | Status: SHIPPED | OUTPATIENT
Start: 2024-11-04

## 2024-11-08 ENCOUNTER — TELEPHONE (OUTPATIENT)
Dept: FAMILY MEDICINE CLINIC | Facility: CLINIC | Age: 59
End: 2024-11-08
Payer: COMMERCIAL

## 2024-11-08 NOTE — TELEPHONE ENCOUNTER
Please see previous message (10/31) about reviewing attached labs, she asked about them again today. Please review and advise

## 2024-12-17 ENCOUNTER — TELEPHONE (OUTPATIENT)
Dept: FAMILY MEDICINE CLINIC | Facility: CLINIC | Age: 59
End: 2024-12-17
Payer: COMMERCIAL

## 2024-12-17 NOTE — TELEPHONE ENCOUNTER
Called patient to reschedule and she advised she is already scheduled on the [place] with a new provider.

## 2025-01-10 ENCOUNTER — TELEMEDICINE (OUTPATIENT)
Dept: FAMILY MEDICINE CLINIC | Facility: CLINIC | Age: 60
End: 2025-01-10
Payer: COMMERCIAL

## 2025-01-10 DIAGNOSIS — F41.1 GENERALIZED ANXIETY DISORDER: ICD-10-CM

## 2025-01-10 DIAGNOSIS — E11.22 TYPE 2 DIABETES MELLITUS WITH STAGE 3A CHRONIC KIDNEY DISEASE, WITHOUT LONG-TERM CURRENT USE OF INSULIN: Primary | ICD-10-CM

## 2025-01-10 DIAGNOSIS — E03.9 ACQUIRED HYPOTHYROIDISM: ICD-10-CM

## 2025-01-10 DIAGNOSIS — Z12.31 SCREENING MAMMOGRAM FOR BREAST CANCER: ICD-10-CM

## 2025-01-10 DIAGNOSIS — F51.01 PRIMARY INSOMNIA: ICD-10-CM

## 2025-01-10 DIAGNOSIS — Z79.899 HIGH RISK MEDICATION USE: ICD-10-CM

## 2025-01-10 DIAGNOSIS — M15.0 PRIMARY OSTEOARTHRITIS INVOLVING MULTIPLE JOINTS: ICD-10-CM

## 2025-01-10 DIAGNOSIS — N18.31 TYPE 2 DIABETES MELLITUS WITH STAGE 3A CHRONIC KIDNEY DISEASE, WITHOUT LONG-TERM CURRENT USE OF INSULIN: Primary | ICD-10-CM

## 2025-01-10 PROCEDURE — 99215 OFFICE O/P EST HI 40 MIN: CPT | Performed by: PHYSICIAN ASSISTANT

## 2025-01-10 RX ORDER — FINERENONE 10 MG/1
10 TABLET, FILM COATED ORAL DAILY
Qty: 90 TABLET | Refills: 0 | Status: SHIPPED | OUTPATIENT
Start: 2025-01-10

## 2025-01-10 RX ORDER — MELOXICAM 7.5 MG/1
7.5 TABLET ORAL DAILY
Qty: 90 TABLET | Refills: 0 | Status: SHIPPED | OUTPATIENT
Start: 2025-01-10

## 2025-01-10 RX ORDER — TRAZODONE HYDROCHLORIDE 50 MG/1
150 TABLET, FILM COATED ORAL NIGHTLY
COMMUNITY
Start: 2021-10-01 | End: 2025-01-10 | Stop reason: SDUPTHER

## 2025-01-10 RX ORDER — BUSPIRONE HYDROCHLORIDE 10 MG/1
10 TABLET ORAL 2 TIMES DAILY
Qty: 180 TABLET | Refills: 0 | Status: SHIPPED | OUTPATIENT
Start: 2025-01-10

## 2025-01-10 RX ORDER — TRAZODONE HYDROCHLORIDE 50 MG/1
TABLET, FILM COATED ORAL
Qty: 270 TABLET | Refills: 0 | Status: SHIPPED | OUTPATIENT
Start: 2025-01-10

## 2025-01-10 RX ORDER — SEMAGLUTIDE 2.68 MG/ML
2 INJECTION, SOLUTION SUBCUTANEOUS WEEKLY
COMMUNITY
Start: 2025-01-07

## 2025-01-10 NOTE — ASSESSMENT & PLAN NOTE
Ozempic and metformin managed by women's health, will start Kerendia 10 mg daily for CKD.  eGFR consistently running in the 50s times at least 3 years and patient has increased microalbumin in her urine.  Repeat a creatinine, EGFR and potassium level in 4 to 5 weeks.  Consider increasing to 20 mg daily if labs looking okay.

## 2025-01-10 NOTE — ASSESSMENT & PLAN NOTE
Continue trazodone but I recommend cutting back to 100 mg at bedtime.  We need to treat the underlying anxiety that is causing the insomnia.  Patient denies any type of snoring or witnessed apneas.

## 2025-01-10 NOTE — PROGRESS NOTES
Patient MyChart Video Visit     Patient Name: Toma Martinez  : 1965   MRN: [id number]     This provider is located at [facility], [address] [place] [address]  using a secure MyChart Video Visit through [employer]. Patient is being seen remotely via telehealth at their home address in [place], and stated they are in a secure environment for this session. The patient's condition being diagnosed/treated is appropriate for telemedicine. The provider identified herself as well as her credentials.   The patient, and/or patients guardian, consent to be seen remotely, and when consent is given they understand that the consent allows for patient identifiable information to be sent to a third party as needed.   They may refuse to be seen remotely at any time. The electronic data is encrypted and password protected, and the patient and/or guardian has been advised of the potential risks to privacy not withstanding such measures.     Chief Complaint:    Chief Complaint   Patient presents with    Med Refill    Anxiety    Insomnia    Chronic Kidney Disease    Diabetes       History of Present Illness: Toma Martinez is a 59 y.o. female who is here today to via MyChart video visit to establish care and for some medication refills.  She has not been sleeping, thinks due to anxiety.  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  He is very impatient and difficult to work for.  Patient's diabetes medicines are managed by [facility] [employer] in [place].  Some labs done back in October.  There was some concern about her kidney function with a creatinine of 1.25 and an EGFR of 50.  Her TSH was good at 2.490 and patient had been off of her thyroid medicine for a couple of months when this lab was done.  She questions whether she actually has hypothyroidism.  Her hemoglobin A1c was good at 5.5.  Patient has lost  83 pounds in the past year and a half.    Subjective      Review of Systems:         Past Medical History:   Past Medical History:   Diagnosis Date    Arthritis     Breast cyst, left     Cancer 10/20/2020    Skin cancer    Cholelithiasis     Depression     DRUG THERAPY    Depressive disorder     Hypertension     Tonsillitis        Past Surgical History:   Past Surgical History:   Procedure Laterality Date    BREAST CYST EXCISION Left     BENIGN    CHOLECYSTECTOMY  02/2009    HYSTERECTOMY  4/2003    TONSILLECTOMY         Family History:   Family History   Problem Relation Age of Onset    Hypertension Mother     Clotting disorder Father         BLOOD CLOT    Coronary artery disease Maternal Grandmother     Heart disease Maternal Grandmother     Coronary artery disease Maternal Grandfather     Heart disease Maternal Grandfather     Stroke Paternal Grandmother     Diabetes Sister     Alcohol abuse Paternal Grandfather        Social History:   Social History     Socioeconomic History    Marital status:    Tobacco Use    Smoking status: Never     Passive exposure: Never    Smokeless tobacco: Never   Vaping Use    Vaping status: Never Used   Substance and Sexual Activity    Alcohol use: Not Currently    Drug use: Never    Sexual activity: Defer       Medications:     Current Outpatient Medications:     meloxicam (MOBIC) 7.5 MG tablet, Take 1 tablet by mouth Daily., Disp: 90 tablet, Rfl: 0    metFORMIN ER (GLUCOPHAGE-XR) 500 MG 24 hr tablet, Take 2 tablets by mouth Daily., Disp: , Rfl:     Ozempic, 2 MG/DOSE, 8 MG/3ML solution pen-injector, Inject 2 mg under the skin into the appropriate area as directed 1 (One) Time Per Week., Disp: , Rfl:     traZODone (DESYREL) 50 MG tablet, 2-3 po qhs for insomnia, Disp: 270 tablet, Rfl: 0    busPIRone (BUSPAR) 10 MG tablet, Take 1 tablet by mouth 2 (Two) Times a Day., Disp: 180 tablet, Rfl: 0    Finerenone (Kerendia) 10 MG tablet, Take 1 tablet by mouth Daily., Disp: 90 tablet,  Rfl: 0    Allergies:   Allergies   Allergen Reactions    Penicillins Hives       Objective     Physical Exam:    Due to extenuating circumstances and possible current health risks associated with the patient being present in a clinical setting (with current health restrictions in place in regards to possible COVID 19 transmission/exposure), the patient was seen remotely today via a MyChart Video Visit through [facility].  Unable to obtain vital signs due to nature of remote visit.      Vital Signs: There were no vitals filed for this visit.  There is no height or weight on file to calculate BMI.           Physical Exam  Constitutional:       General: She is not in acute distress.     Appearance: Normal appearance.   Neurological:      Mental Status: She is alert.   Psychiatric:         Attention and Perception: Attention normal.         Mood and Affect: Mood is anxious. Affect is tearful.         Speech: Speech normal.         Behavior: Behavior normal.         Thought Content: Thought content normal.         Cognition and Memory: Cognition normal.         Judgment: Judgment normal.         Procedures    Assessment / Plan      Assessment/Plan:   Diagnoses and all orders for this visit:    1. Type 2 diabetes mellitus with stage 3a chronic kidney disease, without long-term current use of insulin (Primary)  Assessment & Plan:  Ozempic and metformin managed by women's health, will start Kerendia 10 mg daily for CKD.  eGFR consistently running in the 50s times at least 3 years and patient has increased microalbumin in her urine.  Repeat a creatinine, EGFR and potassium level in 4 to 5 weeks.  Consider increasing to 20 mg daily if labs looking okay.    Orders:  -     Finerenone (Kerendia) 10 MG tablet; Take 1 tablet by mouth Daily.  Dispense: 90 tablet; Refill: 0  -     Hemoglobin A1c    2. Generalized anxiety disorder  Assessment & Plan:  Supportive counseling, refer to behavioral health for counseling.  Patient had been on  buspirone but stopped taking.  She said this did help some for her anxiety.  She still has some so we will restart at 7.5 mg twice a day and send in a prescription for 10 mg to take twice a day after being on the lower dose for a couple of weeks.  Follow-up in about 6 weeks.    Orders:  -     busPIRone (BUSPAR) 10 MG tablet; Take 1 tablet by mouth 2 (Two) Times a Day.  Dispense: 180 tablet; Refill: 0  -     Ambulatory Referral to Behavioral Health    3. Primary osteoarthritis involving multiple joints  -     meloxicam (MOBIC) 7.5 MG tablet; Take 1 tablet by mouth Daily.  Dispense: 90 tablet; Refill: 0    4. Acquired hypothyroidism  -     TSH  -     T4, Free  -     Thyroid Antibodies; Future    5. Screening mammogram for breast cancer  -     Mammo Screening Digital Tomosynthesis Bilateral With CAD; Future    6. High risk medication use  -     Comprehensive Metabolic Panel    7. Primary insomnia  Assessment & Plan:  Continue trazodone but I recommend cutting back to 100 mg at bedtime.  We need to treat the underlying anxiety that is causing the insomnia.  Patient denies any type of snoring or witnessed apneas.    Orders:  -     traZODone (DESYREL) 50 MG tablet; 2-3 po qhs for insomnia  Dispense: 270 tablet; Refill: 0             I spent 55 minutes caring for Toma on this date of service. This time includes time spent by me in the following activities:preparing for the visit, reviewing tests, obtaining and/or reviewing a separately obtained history, performing a medically appropriate examination and/or evaluation , counseling and educating the patient/family/caregiver, ordering medications, tests, or procedures, referring and communicating with other health care professionals , and documenting information in the medical record    Follow Up:   Return in about 6 weeks (around 2/21/2025).    Farnaz Blount PA-C   [facility]     NOTE TO PATIENT: The 21st Century Cures Act makes medical notes like  these available to patients in the interest of transparency. However, be advised this is a medical document. It is intended as peer to peer communication. It is written in medical language and may contain abbreviations or verbiage that are unfamiliar. It may appear blunt or direct. Medical documents are intended to carry relevant information, facts as evident, and the clinical opinion of the practitioner.

## 2025-01-10 NOTE — ASSESSMENT & PLAN NOTE
Supportive counseling, refer to behavioral health for counseling.  Patient had been on buspirone but stopped taking.  She said this did help some for her anxiety.  She still has some so we will restart at 7.5 mg twice a day and send in a prescription for 10 mg to take twice a day after being on the lower dose for a couple of weeks.  Follow-up in about 6 weeks.

## 2025-01-16 ENCOUNTER — PRIOR AUTHORIZATION (OUTPATIENT)
Dept: FAMILY MEDICINE CLINIC | Facility: CLINIC | Age: 60
End: 2025-01-16
Payer: COMMERCIAL

## 2025-01-24 ENCOUNTER — OFFICE VISIT (OUTPATIENT)
Dept: BEHAVIORAL HEALTH | Facility: CLINIC | Age: 60
End: 2025-01-24
Payer: COMMERCIAL

## 2025-01-24 DIAGNOSIS — F32.A DEPRESSIVE DISORDER: Primary | ICD-10-CM

## 2025-01-24 DIAGNOSIS — F41.1 GENERALIZED ANXIETY DISORDER: ICD-10-CM

## 2025-01-24 DIAGNOSIS — F45.22 BODY DYSMORPHIC DISORDER WITH MUSCLE DYSMORPHIA: ICD-10-CM

## 2025-01-24 PROCEDURE — 90837 PSYTX W PT 60 MINUTES: CPT | Performed by: SOCIAL WORKER

## 2025-01-24 NOTE — PROGRESS NOTES
Follow Up  Adult Note     Date:2025   Patient Name: Toma Martinez  : 1965   MRN: [id number]   Time IN: 9:30 am    Time OUT: 10:30 am     Referring Provider: Farnaz Blount PA    Chief Complaint:    No diagnosis found.     History of Present Illness:   Toma Martinez is a 59 y.o. female who presents to clinic today for Psychotherapy.    Toma            Subjective       PHQ-9 Depression Screening  Little interest or pleasure in doing things?  2   Feeling down, depressed, or hopeless?  2   PHQ-2 Total Score  0   Trouble falling or staying asleep, or sleeping too much?  3   Feeling tired or having little energy?  3   Poor appetite or overeating?  3   Feeling bad about yourself - or that you are a failure or have let yourself or your family down?  3   Trouble concentrating on things, such as reading the newspaper or watching television?  2   Moving or speaking so slowly that other people could have noticed? Or the opposite - being so fidgety or restless that you have been moving around a lot more than usual?  0   Thoughts that you would be better off dead, or of hurting yourself in some way?  0   PHQ-9 Total Score  18   If you checked off any problems, how difficult have these problems made it for you to do your work, take care of things at home, or get along with other people?     Previous PHQ-9 Score:       YUMIKO-7  Feeling nervous, anxious or on edge     Not being able to stop or control worrying     Worrying too much about different things     Trouble relaxing     Being so restless that it is hard to sit still     Becoming easily annoyed or irritable     Feeling Afraid as if something awful might happen     YUMIKO Total Score     If you checked any problems, how difficult have these problems made it for you to do your work, take care of things at home or get along with other people?     Previous YUMIKO-7 Score:    Triggers: (Persons/Places/Things/Events/Thought/Emotions): ***    Medications:     Current  Outpatient Medications:     busPIRone (BUSPAR) 10 MG tablet, Take 1 tablet by mouth 2 (Two) Times a Day., Disp: 180 tablet, Rfl: 0    Finerenone (Kerendia) 10 MG tablet, Take 1 tablet by mouth Daily., Disp: 90 tablet, Rfl: 0    meloxicam (MOBIC) 7.5 MG tablet, Take 1 tablet by mouth Daily., Disp: 90 tablet, Rfl: 0    metFORMIN ER (GLUCOPHAGE-XR) 500 MG 24 hr tablet, Take 2 tablets by mouth Daily., Disp: , Rfl:     Ozempic, 2 MG/DOSE, 8 MG/3ML solution pen-injector, Inject 2 mg under the skin into the appropriate area as directed 1 (One) Time Per Week., Disp: , Rfl:     traZODone (DESYREL) 50 MG tablet, 2-3 po qhs for insomnia, Disp: 270 tablet, Rfl: 0    Allergies:   Allergies   Allergen Reactions    Penicillins Hives       Objective       MENTAL STATUS EXAM     SUICIDE RISK ASSESSMENT/CSSRS:  1. Does patient have thoughts of suicide? {Yes or No:24581}  2. Does patient have intent for suicide? {Yes or No:64581}  3. Does patient have a current plan for suicide? {Yes or No:18649}    Assessment / Plan      Visit Diagnosis/Orders Placed This Visit:  No diagnosis found.     PLAN:  Safety: {Safety :95031}  Risk Assessment: Risk of self-harm acutely is low. Risk of self-harm chronically is also low, but could be further elevated in the event of treatment noncompliance and/or AODA.    Treatment Plan/ Short and Long Term Goals: Continue supportive psychotherapy efforts and medications as indicated. Treatment and medication options discussed during today's visit. Patient ackowledged and verbally consented to continue with current treatment plan and was educated on the importance of compliance with treatment and follow-up appointments. Patient seems reasonably able to adhere to treatment plan.      Assisted Patient in processing above session content; acknowledged and normalized patient’s thoughts, feelings, and concerns.  Rationalized patient thought process regarding ***.      Allowed Patient to freely discuss issues   without interruption or judgement with unconditional positive regard, active listening skills, and empathy. Therapist provided a safe, confidential environment to facilitate the development of a positive therapeutic relationship and encouraged open, honest communication. Assisted Patient in identifying risk factors which would indicate the need for higher level of care including thoughts to harm self or others and/or self-harming behavior and encouraged Patient to contact this office, call 911, or present to the nearest emergency room should any of these events occur. Discussed crisis intervention services and means to access. Patient adamantly and convincingly denies current suicidal or homicidal ideation or perceptual disturbance. Assisted Patient in processing session content; acknowledged and normalized Patient’s thoughts, feelings, and concerns by utilizing a person-centered approach in efforts to build appropriate rapport and a positive therapeutic relationship with open and honest communication.     Follow Up:   No follow-ups on file.    Francisco Javier Welch LCSW, [facility]
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
## 2025-01-31 ENCOUNTER — OFFICE VISIT (OUTPATIENT)
Dept: BEHAVIORAL HEALTH | Facility: CLINIC | Age: 60
End: 2025-01-31
Payer: COMMERCIAL

## 2025-01-31 DIAGNOSIS — F32.A DEPRESSIVE DISORDER: Primary | ICD-10-CM

## 2025-01-31 DIAGNOSIS — F45.22 BODY DYSMORPHIC DISORDER: ICD-10-CM

## 2025-01-31 PROCEDURE — 90834 PSYTX W PT 45 MINUTES: CPT | Performed by: SOCIAL WORKER

## 2025-01-31 NOTE — PROGRESS NOTES
Follow Up  Adult Note     Date:2025   Patient Name: Toma Martinez  : 1965   MRN: [id number]   Time IN: 9:45 am    Time OUT: 10:30 am     Referring Provider: Farnaz Blount PA    Chief Complaint:      ICD-10-CM ICD-9-CM   1. Depressive disorder  F32.A 311   2. Body dysmorphic disorder  F45.22 300.7      History of Present Illness:   Toma Martinez is a 59 y.o. female who presents to clinic today for Psychotherapy.    , Juan Manuel   Parents - Iknathalia & Domitila   Sister - Evelin   Nephew - Ramiro, 31 y/o (Autistic)  Niece - Wendie , 24 y/o   Great nephew - Wilfred - 6 y/o     Feeling overwhelmed  I cry when I feel overwhelmed  That's the way I cope    Sometime I felt like I was letting God down  It's like I wasn't trusting Him to take care of things  It was an ugly cry    Ramiro was hospitalized with diabetic ketoacidosis  He's home not but still under treatment  He cannot tell us when he is sick or in pain  He has no ability to identify pain or communicate when he is in pain    Mother feel and will have to have hip replacement surgery  Has to wait until first of March  She will not take pain medications  She is stubborn and sits around in much pain  Deteriorating disc in her back for years  Parents are taking care of their ADL's   Parents live alone but next to my sister     I live with     The last six months I've not felt like myself  Weight loss coincides with the feelings of low self-worth and self-esteem  And it coincides with reduced interactions with others  And not feeling like herself     Subjective     Triggers: (Persons/Places/Things/Events/Thought/Emotions): Body image     Medications:   Current Outpatient Medications:     busPIRone (BUSPAR) 10 MG tablet, Take 1 tablet by mouth 2 (Two) Times a Day., Disp: 180 tablet, Rfl: 0    Finerenone (Kerendia) 10 MG tablet, Take 1 tablet by mouth Daily., Disp: 90 tablet, Rfl: 0    meloxicam (MOBIC) 7.5 MG tablet, Take 1 tablet by mouth Daily.,  Disp: 90 tablet, Rfl: 0    metFORMIN ER (GLUCOPHAGE-XR) 500 MG 24 hr tablet, Take 2 tablets by mouth Daily., Disp: , Rfl:     Ozempic, 2 MG/DOSE, 8 MG/3ML solution pen-injector, Inject 2 mg under the skin into the appropriate area as directed 1 (One) Time Per Week., Disp: , Rfl:     traZODone (DESYREL) 50 MG tablet, 2-3 po qhs for insomnia, Disp: 270 tablet, Rfl: 0    Allergies:   Allergies   Allergen Reactions    Penicillins Hives     Objective     MENTAL STATUS EXAM   General Appearance:  Cleanly groomed and dressed  Eye Contact:  Good eye contact  Attitude:  Cooperative  Motor Activity:  Normal gait, posture  Muscle Strength:  Normal  Speech:  Normal rate, tone, volume  Language:  Spontaneous  Mood and affect:  Normal, pleasant  Thought Process:  Logical and goal-directed  Associations/ Thought Content:  No delusions  Hallucinations:  None  Suicidal Ideations:  Not present  Homicidal Ideation:  Not present  Sensorium:  Alert and clear  Orientation:  Person, time, situation and place  Immediate Recall, Recent, and Remote Memory:  Intact  Attention Span/ Concentration:  Good  Fund of Knowledge:  Appropriate for age and educational level  Intellectual Functioning:  Above average  Insight:  Good  Judgement:  Good  Reliability:  Good  Impulse Control:  Good     SUICIDE RISK ASSESSMENT/CSSRS:  1. Does patient have thoughts of suicide? no  2. Does patient have intent for suicide? no  3. Does patient have a current plan for suicide? no    Assessment / Plan      Visit Diagnosis/Orders Placed This Visit:    ICD-10-CM ICD-9-CM   1. Depressive disorder  F32.A 311   2. Body dysmorphic disorder  F45.22 300.7      PLAN:  Safety: No acute safety concerns  Risk Assessment: Risk of self-harm acutely is low. Risk of self-harm chronically is also low, but could be further elevated in the event of treatment noncompliance and/or AODA.    Treatment Plan/ Short and Long Term Goals: Continue supportive psychotherapy efforts and  medications as indicated. Treatment and medication options discussed during today's visit. Patient ackowledged and verbally consented to continue with current treatment plan and was educated on the importance of compliance with treatment and follow-up appointments. Patient seems reasonably able to adhere to treatment plan.      Assisted Patient in processing above session content; acknowledged and normalized patient’s thoughts, feelings, and concerns.  Rationalized patient thought process regarding Goals for therapy:    Get out of the blah I'm feeling. It's like feeling stuck and I can't get out.    Don't want to leave the house. Not my nature to be closed off.  To feel better about myself as a person - I disappoint other people such as co-workers, , God  my parents, and myself (more than anybody else)    Read about Body dysmorphic disorder      Allowed Patient to freely discuss issues  without interruption or judgement with unconditional positive regard, active listening skills, and empathy. Therapist provided a safe, confidential environment to facilitate the development of a positive therapeutic relationship and encouraged open, honest communication. Assisted Patient in identifying risk factors which would indicate the need for higher level of care including thoughts to harm self or others and/or self-harming behavior and encouraged Patient to contact this office, call 911, or present to the nearest emergency room should any of these events occur. Discussed crisis intervention services and means to access. Patient adamantly and convincingly denies current suicidal or homicidal ideation or perceptual disturbance. Assisted Patient in processing session content; acknowledged and normalized Patient’s thoughts, feelings, and concerns by utilizing a person-centered approach in efforts to build appropriate rapport and a positive therapeutic relationship with open and honest communication.     Follow Up:   Return  in about 1 week (around 2/7/2025) for Psychoterapy.    Francisco Javier Welch, EVELYNW, [facility]

## 2025-02-07 ENCOUNTER — OFFICE VISIT (OUTPATIENT)
Dept: BEHAVIORAL HEALTH | Facility: CLINIC | Age: 60
End: 2025-02-07
Payer: COMMERCIAL

## 2025-02-07 ENCOUNTER — LAB (OUTPATIENT)
Age: 60
End: 2025-02-07
Payer: COMMERCIAL

## 2025-02-07 DIAGNOSIS — Z79.899 HIGH RISK MEDICATION USE: ICD-10-CM

## 2025-02-07 DIAGNOSIS — E11.22 TYPE 2 DIABETES MELLITUS WITH STAGE 3A CHRONIC KIDNEY DISEASE, WITHOUT LONG-TERM CURRENT USE OF INSULIN: ICD-10-CM

## 2025-02-07 DIAGNOSIS — N18.31 TYPE 2 DIABETES MELLITUS WITH STAGE 3A CHRONIC KIDNEY DISEASE, WITHOUT LONG-TERM CURRENT USE OF INSULIN: ICD-10-CM

## 2025-02-07 DIAGNOSIS — F41.1 GENERALIZED ANXIETY DISORDER: ICD-10-CM

## 2025-02-07 DIAGNOSIS — F33.0 MILD EPISODE OF RECURRENT MAJOR DEPRESSIVE DISORDER: Primary | ICD-10-CM

## 2025-02-07 DIAGNOSIS — E03.9 ACQUIRED HYPOTHYROIDISM: ICD-10-CM

## 2025-02-07 PROBLEM — F45.22 BODY DYSMORPHIC DISORDER: Status: RESOLVED | Noted: 2025-01-24 | Resolved: 2025-02-07

## 2025-02-07 LAB
ALBUMIN SERPL-MCNC: 4.2 G/DL (ref 3.5–5.2)
ALBUMIN/GLOB SERPL: 1.4 G/DL
ALP SERPL-CCNC: 105 U/L (ref 39–117)
ALT SERPL W P-5'-P-CCNC: 7 U/L (ref 1–33)
ANION GAP SERPL CALCULATED.3IONS-SCNC: 9.5 MMOL/L (ref 5–15)
AST SERPL-CCNC: 16 U/L (ref 1–32)
BILIRUB SERPL-MCNC: 0.2 MG/DL (ref 0–1.2)
BUN SERPL-MCNC: 14 MG/DL (ref 6–20)
BUN/CREAT SERPL: 12.7 (ref 7–25)
CALCIUM SPEC-SCNC: 9.9 MG/DL (ref 8.6–10.5)
CHLORIDE SERPL-SCNC: 104 MMOL/L (ref 98–107)
CO2 SERPL-SCNC: 25.5 MMOL/L (ref 22–29)
CREAT SERPL-MCNC: 1.1 MG/DL (ref 0.57–1)
EGFRCR SERPLBLD CKD-EPI 2021: 58 ML/MIN/1.73
GLOBULIN UR ELPH-MCNC: 3 GM/DL
GLUCOSE SERPL-MCNC: 87 MG/DL (ref 65–99)
POTASSIUM SERPL-SCNC: 4.7 MMOL/L (ref 3.5–5.2)
PROT SERPL-MCNC: 7.2 G/DL (ref 6–8.5)
SODIUM SERPL-SCNC: 139 MMOL/L (ref 136–145)
T4 FREE SERPL-MCNC: 1.24 NG/DL (ref 0.92–1.68)
TSH SERPL DL<=0.05 MIU/L-ACNC: 2.89 UIU/ML (ref 0.27–4.2)

## 2025-02-07 PROCEDURE — 36415 COLL VENOUS BLD VENIPUNCTURE: CPT | Performed by: PHYSICIAN ASSISTANT

## 2025-02-07 PROCEDURE — 80053 COMPREHEN METABOLIC PANEL: CPT | Performed by: PHYSICIAN ASSISTANT

## 2025-02-07 PROCEDURE — 83036 HEMOGLOBIN GLYCOSYLATED A1C: CPT | Performed by: PHYSICIAN ASSISTANT

## 2025-02-07 PROCEDURE — 90834 PSYTX W PT 45 MINUTES: CPT | Performed by: SOCIAL WORKER

## 2025-02-07 PROCEDURE — 84439 ASSAY OF FREE THYROXINE: CPT | Performed by: PHYSICIAN ASSISTANT

## 2025-02-07 PROCEDURE — 84443 ASSAY THYROID STIM HORMONE: CPT | Performed by: PHYSICIAN ASSISTANT

## 2025-02-07 NOTE — PROGRESS NOTES
"     Follow Up  Adult Note     Date:2025   Patient Name: Toma Martinez  : 1965   MRN: [id number]   Time IN: 2:00 pm    Time OUT: 2:45 pm      Referring Provider: Farnaz Blount PA    Chief Complaint:      ICD-10-CM ICD-9-CM   1. Mild episode of recurrent major depressive disorder  F33.0 296.31   2. Generalized anxiety disorder  F41.1 300.02        History of Present Illness:   Toma Martinez is a 59 y.o. female who presents to clinic today for Psychotherapy.    Did her homework to look up BDD   We agreed this is not an accurate diagnosis for her     I can accept others with their flaws  I cannot accept me with my flaws   -my mistakes  -my past   -why was I so stupid  -why did I let other people treat me so bad  -when I don't reach my expectations I would have a meltdown  -in middle school I became aware of high expectations    \"The voice of the inner critique\"  -as far back as I can remember    Letting go of things  God forgives but I dwell on those failures     Themes:  Imago dei  Cindy esteem    Subjective     Triggers: (Persons/Places/Things/Events/Thought/Emotions): poor self-worth    Medications:   Current Outpatient Medications:     busPIRone (BUSPAR) 10 MG tablet, Take 1 tablet by mouth 2 (Two) Times a Day., Disp: 180 tablet, Rfl: 0    Finerenone (Kerendia) 10 MG tablet, Take 1 tablet by mouth Daily., Disp: 90 tablet, Rfl: 0    meloxicam (MOBIC) 7.5 MG tablet, Take 1 tablet by mouth Daily., Disp: 90 tablet, Rfl: 0    metFORMIN ER (GLUCOPHAGE-XR) 500 MG 24 hr tablet, Take 2 tablets by mouth Daily., Disp: , Rfl:     Ozempic, 2 MG/DOSE, 8 MG/3ML solution pen-injector, Inject 2 mg under the skin into the appropriate area as directed 1 (One) Time Per Week., Disp: , Rfl:     traZODone (DESYREL) 50 MG tablet, 2-3 po qhs for insomnia, Disp: 270 tablet, Rfl: 0    Allergies:   Allergies   Allergen Reactions    Penicillins Hives     Objective     MENTAL STATUS EXAM   General Appearance:  Cleanly groomed " and dressed  Eye Contact:  Good eye contact  Attitude:  Cooperative  Motor Activity:  Normal gait, posture  Muscle Strength:  Normal  Speech:  Normal rate, tone, volume  Language:  Spontaneous  Mood and affect:  Normal, pleasant  Thought Process:  Logical and goal-directed  Associations/ Thought Content:  No delusions  Hallucinations:  None  Suicidal Ideations:  Not present  Homicidal Ideation:  Not present  Sensorium:  Alert and clear  Orientation:  Person, place, time and situation  Immediate Recall, Recent, and Remote Memory:  Intact  Attention Span/ Concentration:  Good  Fund of Knowledge:  Appropriate for age and educational level  Intellectual Functioning:  Above average  Insight:  Good  Judgement:  Good  Reliability:  Good  Impulse Control:  Good     SUICIDE RISK ASSESSMENT/CSSRS:  1. Does patient have thoughts of suicide? no  2. Does patient have intent for suicide? no  3. Does patient have a current plan for suicide? no    Assessment / Plan      Visit Diagnosis/Orders Placed This Visit:    ICD-10-CM ICD-9-CM   1. Mild episode of recurrent major depressive disorder  F33.0 296.31   2. Generalized anxiety disorder  F41.1 300.02      PLAN:  Safety: No acute safety concerns  Risk Assessment: Risk of self-harm acutely is low. Risk of self-harm chronically is also low, but could be further elevated in the event of treatment noncompliance and/or AODA.    Treatment Plan/ Short and Long Term Goals: Continue supportive psychotherapy efforts and medications as indicated. Treatment and medication options discussed during today's visit. Patient ackowledged and verbally consented to continue with current treatment plan and was educated on the importance of compliance with treatment and follow-up appointments. Patient seems reasonably able to adhere to treatment plan.      Assisted Patient in processing above session content; acknowledged and normalized patient’s thoughts, feelings, and concerns.  Rationalized patient  thought process regarding Imago Dei / works based acceptance / Voice of Perfectionism vs. Voice of Cindy & Acceptance      Allowed Patient to freely discuss issues  without interruption or judgement with unconditional positive regard, active listening skills, and empathy. Therapist provided a safe, confidential environment to facilitate the development of a positive therapeutic relationship and encouraged open, honest communication. Assisted Patient in identifying risk factors which would indicate the need for higher level of care including thoughts to harm self or others and/or self-harming behavior and encouraged Patient to contact this office, call 911, or present to the nearest emergency room should any of these events occur. Discussed crisis intervention services and means to access. Patient adamantly and convincingly denies current suicidal or homicidal ideation or perceptual disturbance. Assisted Patient in processing session content; acknowledged and normalized Patient’s thoughts, feelings, and concerns by utilizing a person-centered approach in efforts to build appropriate rapport and a positive therapeutic relationship with open and honest communication.     Follow Up:   Return for Psychoterapy.    Francisco Javier Welch, EVELYNW, [facility]

## 2025-02-08 LAB — HBA1C MFR BLD: 5.1 % (ref 4.8–5.6)

## 2025-02-10 LAB
THYROGLOB AB SERPL-ACNC: 9.2 IU/ML (ref 0–0.9)
THYROPEROXIDASE AB SERPL-ACNC: 134 IU/ML (ref 0–34)

## 2025-02-14 ENCOUNTER — OFFICE VISIT (OUTPATIENT)
Dept: BEHAVIORAL HEALTH | Facility: CLINIC | Age: 60
End: 2025-02-14
Payer: COMMERCIAL

## 2025-02-14 ENCOUNTER — OFFICE VISIT (OUTPATIENT)
Dept: FAMILY MEDICINE CLINIC | Facility: CLINIC | Age: 60
End: 2025-02-14
Payer: COMMERCIAL

## 2025-02-14 VITALS
BODY MASS INDEX: 30.4 KG/M2 | DIASTOLIC BLOOD PRESSURE: 80 MMHG | HEIGHT: 67 IN | OXYGEN SATURATION: 99 % | WEIGHT: 193.7 LBS | SYSTOLIC BLOOD PRESSURE: 122 MMHG | HEART RATE: 77 BPM

## 2025-02-14 DIAGNOSIS — F41.1 GENERALIZED ANXIETY DISORDER: ICD-10-CM

## 2025-02-14 DIAGNOSIS — M15.0 PRIMARY OSTEOARTHRITIS INVOLVING MULTIPLE JOINTS: ICD-10-CM

## 2025-02-14 DIAGNOSIS — N18.31 TYPE 2 DIABETES MELLITUS WITH STAGE 3A CHRONIC KIDNEY DISEASE, WITHOUT LONG-TERM CURRENT USE OF INSULIN: Primary | ICD-10-CM

## 2025-02-14 DIAGNOSIS — F33.0 MILD EPISODE OF RECURRENT MAJOR DEPRESSIVE DISORDER: Primary | ICD-10-CM

## 2025-02-14 DIAGNOSIS — Z86.59 HISTORY OF OCD (OBSESSIVE COMPULSIVE DISORDER): ICD-10-CM

## 2025-02-14 DIAGNOSIS — F51.01 PRIMARY INSOMNIA: ICD-10-CM

## 2025-02-14 DIAGNOSIS — E11.22 TYPE 2 DIABETES MELLITUS WITH STAGE 3A CHRONIC KIDNEY DISEASE, WITHOUT LONG-TERM CURRENT USE OF INSULIN: Primary | ICD-10-CM

## 2025-02-14 DIAGNOSIS — Z23 ENCOUNTER FOR IMMUNIZATION: ICD-10-CM

## 2025-02-14 PROCEDURE — 99214 OFFICE O/P EST MOD 30 MIN: CPT | Performed by: FAMILY MEDICINE

## 2025-02-14 PROCEDURE — 90834 PSYTX W PT 45 MINUTES: CPT | Performed by: SOCIAL WORKER

## 2025-02-14 PROCEDURE — 90471 IMMUNIZATION ADMIN: CPT | Performed by: FAMILY MEDICINE

## 2025-02-14 PROCEDURE — 90715 TDAP VACCINE 7 YRS/> IM: CPT | Performed by: FAMILY MEDICINE

## 2025-02-14 RX ORDER — BUSPIRONE HYDROCHLORIDE 10 MG/1
10 TABLET ORAL 2 TIMES DAILY
Qty: 180 TABLET | Refills: 1 | Status: SHIPPED | OUTPATIENT
Start: 2025-02-14

## 2025-02-14 RX ORDER — MELOXICAM 7.5 MG/1
7.5 TABLET ORAL DAILY
Qty: 90 TABLET | Refills: 0 | Status: CANCELLED | OUTPATIENT
Start: 2025-02-14

## 2025-02-14 RX ORDER — TRAZODONE HYDROCHLORIDE 50 MG/1
TABLET, FILM COATED ORAL
Qty: 270 TABLET | Refills: 1 | Status: SHIPPED | OUTPATIENT
Start: 2025-02-14

## 2025-02-14 RX ORDER — METFORMIN HYDROCHLORIDE 500 MG/1
1000 TABLET, EXTENDED RELEASE ORAL DAILY
Qty: 180 TABLET | Refills: 1 | Status: CANCELLED | OUTPATIENT
Start: 2025-02-14

## 2025-02-14 NOTE — PROGRESS NOTES
Follow Up  Adult Note     Date:2025   Patient Name: Toma Martinez  : 1965   MRN: [id number]   Time IN: 9:30 am    Time OUT: 10:15 am      Referring Provider: Farnaz Blount PA    Chief Complaint:      ICD-10-CM ICD-9-CM   1. Mild episode of recurrent major depressive disorder  F33.0 296.31   2. Generalized anxiety disorder  F41.1 300.02   3. History of OCD (obsessive compulsive disorder)  Z86.59 V11.2        History of Present Illness:   Toma Martinez is a 59 y.o. female who presents to clinic today for Psychotherapy.    Been good since last meeting  Haven't cried all week  Busy all week at home and work    Why do I feel the need to be a perfectionist?  -I've always to be the best  -internalized sense of pressure  -no one has put the pressure on me  -I don't cut corners    Discussed OCD like tendencies  Is the behavior serving you or you serving the behavior    Reframing perfectionism from oppressiveness and burdensome to a gift to be used to the glory of God   Do these behaviors impede or enhance ADL's     It may Juan Manuel's clean but it's not Toma's clean    Subjective     Triggers: (Persons/Places/Things/Events/Thought/Emotions): OCD like behaviors     Medications:   Current Outpatient Medications:     busPIRone (BUSPAR) 10 MG tablet, Take 1 tablet by mouth 2 (Two) Times a Day., Disp: 180 tablet, Rfl: 0    Finerenone (Kerendia) 10 MG tablet, Take 1 tablet by mouth Daily., Disp: 90 tablet, Rfl: 0    meloxicam (MOBIC) 7.5 MG tablet, Take 1 tablet by mouth Daily., Disp: 90 tablet, Rfl: 0    metFORMIN ER (GLUCOPHAGE-XR) 500 MG 24 hr tablet, Take 2 tablets by mouth Daily., Disp: , Rfl:     Ozempic, 2 MG/DOSE, 8 MG/3ML solution pen-injector, Inject 2 mg under the skin into the appropriate area as directed 1 (One) Time Per Week., Disp: , Rfl:     traZODone (DESYREL) 50 MG tablet, 2-3 po qhs for insomnia, Disp: 270 tablet, Rfl: 0    Allergies:   Allergies   Allergen Reactions    Penicillins Hives      Objective     MENTAL STATUS EXAM   General Appearance:  Cleanly groomed and dressed  Eye Contact:  Good eye contact  Attitude:  Cooperative  Motor Activity:  Normal gait, posture  Muscle Strength:  Normal  Speech:  Normal rate, tone, volume  Language:  Spontaneous  Mood and affect:  Normal, pleasant  Thought Process:  Goal-directed and logical  Associations/ Thought Content:  No delusions  Hallucinations:  None  Suicidal Ideations:  Not present  Homicidal Ideation:  Not present  Sensorium:  Alert  Orientation:  Person  Immediate Recall, Recent, and Remote Memory:  Intact  Attention Span/ Concentration:  Good  Fund of Knowledge:  Appropriate for age and educational level  Intellectual Functioning:  Above average  Insight:  Good  Judgement:  Good  Reliability:  Good  Impulse Control:  Good     SUICIDE RISK ASSESSMENT/CSSRS:  1. Does patient have thoughts of suicide? no  2. Does patient have intent for suicide? no  3. Does patient have a current plan for suicide? no    Assessment / Plan      Visit Diagnosis/Orders Placed This Visit:    ICD-10-CM ICD-9-CM   1. Mild episode of recurrent major depressive disorder  F33.0 296.31   2. Generalized anxiety disorder  F41.1 300.02   3. History of OCD (obsessive compulsive disorder)  Z86.59 V11.2     PLAN:  Safety: No acute safety concerns  Risk Assessment: Risk of self-harm acutely is low. Risk of self-harm chronically is also low, but could be further elevated in the event of treatment noncompliance and/or AODA.    Treatment Plan/ Short and Long Term Goals: Continue supportive psychotherapy efforts and medications as indicated. Treatment and medication options discussed during today's visit. Patient ackowledged and verbally consented to continue with current treatment plan and was educated on the importance of compliance with treatment and follow-up appointments. Patient seems reasonably able to adhere to treatment plan.      Assisted Patient in processing above session  content; acknowledged and normalized patient’s thoughts, feelings, and concerns.  Rationalized patient thought process regarding Reframing perspectives on OCD like behaviors.      Allowed Patient to freely discuss issues  without interruption or judgement with unconditional positive regard, active listening skills, and empathy. Therapist provided a safe, confidential environment to facilitate the development of a positive therapeutic relationship and encouraged open, honest communication. Assisted Patient in identifying risk factors which would indicate the need for higher level of care including thoughts to harm self or others and/or self-harming behavior and encouraged Patient to contact this office, call 911, or present to the nearest emergency room should any of these events occur. Discussed crisis intervention services and means to access. Patient adamantly and convincingly denies current suicidal or homicidal ideation or perceptual disturbance. Assisted Patient in processing session content; acknowledged and normalized Patient’s thoughts, feelings, and concerns by utilizing a person-centered approach in efforts to build appropriate rapport and a positive therapeutic relationship with open and honest communication.     Follow Up:   Return for Psychoterapy.    Francisco Javier Welch, LCSW, [facility]

## 2025-02-14 NOTE — ASSESSMENT & PLAN NOTE
Kidney function has actually improved, meloxicam sparingly.  Will check a urine albumin creatinine ratio.  Continue Ozempic 2 mg weekly and this is managed by a women's health provider in [place].  Will continue to monitor her eGFR.

## 2025-02-14 NOTE — PROGRESS NOTES
Patient Office Visit      Patient Name: Toma Martinez  : 1965   MRN: [id number]     Chief Complaint:    Chief Complaint   Patient presents with    Chronic Kidney Disease    Anxiety    Insomnia       History of Present Illness: Toam Martinez is a 59 y.o. female who is here today for follow-up.  I saw her for a virtual visit and had her follow-up with labs and an in person visit.  eGFR better than previous at 58.  Hemoglobin A1c stable at 5.10.  Patient has lost over 100 pounds on Ozempic.  Her Ozempic is managed by women's health provider in [place].  Patient does have diabetes which is now well-controlled with Ozempic and weight loss.  Normal TSH, free T4 but increased thyroglobulin antibody and thyroid peroxidase antibody.  She used to be on thyroid medication sometime ago and wonders if she needs to be on medication now.  She takes meloxicam maybe twice a week for arthritis pain in her foot.  This is usually not a problem in warmer weather.  She has a history of fracturing that foot x 2.    Subjective      Review of Systems:         Past Medical History:   Past Medical History:   Diagnosis Date    Arthritis     Breast cyst, left     Cancer 10/20/2020    Skin cancer    Cholelithiasis     Depression     DRUG THERAPY    Depressive disorder     Hypertension     Tonsillitis        Past Surgical History:   Past Surgical History:   Procedure Laterality Date    BREAST CYST EXCISION Left     BENIGN    CHOLECYSTECTOMY  2009    HYSTERECTOMY  2003    TONSILLECTOMY         Family History:   Family History   Problem Relation Age of Onset    Hypertension Mother     Clotting disorder Father         BLOOD CLOT    Coronary artery disease Maternal Grandmother     Heart disease Maternal Grandmother     Coronary artery disease Maternal Grandfather     Heart disease Maternal Grandfather     Stroke Paternal Grandmother     Diabetes Sister     Alcohol abuse Paternal Grandfather        Social History:   Social History  "    Socioeconomic History    Marital status:    Tobacco Use    Smoking status: Never     Passive exposure: Never    Smokeless tobacco: Never   Vaping Use    Vaping status: Never Used   Substance and Sexual Activity    Alcohol use: Not Currently    Drug use: Never    Sexual activity: Defer       Allergies:   Allergies   Allergen Reactions    Penicillins Hives       Objective     Physical Exam:  Vital Signs:   Vitals:    02/14/25 1034   BP: 122/80   BP Location: Left arm   Patient Position: Sitting   Cuff Size: Large Adult   Pulse: 77   SpO2: 99%   Weight: 87.9 kg (193 lb 11.2 oz)   Height: 170.2 cm (67\")   PainSc: 0-No pain     Body mass index is 30.34 kg/m².           Physical Exam  Constitutional:       General: She is not in acute distress.     Appearance: Normal appearance. She is obese.   Cardiovascular:      Rate and Rhythm: Normal rate and regular rhythm.   Pulmonary:      Effort: Pulmonary effort is normal.      Breath sounds: Normal breath sounds.   Musculoskeletal:      Cervical back: Normal range of motion and neck supple.   Neurological:      Mental Status: She is alert and oriented to person, place, and time.   Psychiatric:         Mood and Affect: Mood normal.         Behavior: Behavior normal.         Thought Content: Thought content normal.         Judgment: Judgment normal.         Procedures    Assessment / Plan      Assessment/Plan:   Diagnoses and all orders for this visit:    1. Type 2 diabetes mellitus with stage 3a chronic kidney disease, without long-term current use of insulin (Primary)  Assessment & Plan:  Kidney function has actually improved, meloxicam sparingly.  Will check a urine albumin creatinine ratio.  Continue Ozempic 2 mg weekly and this is managed by a women's health provider in [place].  Will continue to monitor her eGFR.    Orders:  -     Microalbumin / Creatinine Urine Ratio - Urine, Clean Catch    2. Primary insomnia  Assessment & Plan:  Stable, continue " trazodone.    Orders:  -     traZODone (DESYREL) 50 MG tablet; 2-3 po qhs for insomnia  Dispense: 270 tablet; Refill: 1    3. Primary osteoarthritis involving multiple joints  Assessment & Plan:  Continue meloxicam just as needed.      4. Generalized anxiety disorder  Assessment & Plan:  Continue medication, continue counseling.    Orders:  -     busPIRone (BUSPAR) 10 MG tablet; Take 1 tablet by mouth 2 (Two) Times a Day.  Dispense: 180 tablet; Refill: 1    5. Encounter for immunization  -     Tdap Vaccine Greater Than or Equal To 6yo IM        Medications:     Current Outpatient Medications:     busPIRone (BUSPAR) 10 MG tablet, Take 1 tablet by mouth 2 (Two) Times a Day., Disp: 180 tablet, Rfl: 1    meloxicam (MOBIC) 7.5 MG tablet, Take 1 tablet by mouth Daily., Disp: 90 tablet, Rfl: 0    metFORMIN ER (GLUCOPHAGE-XR) 500 MG 24 hr tablet, Take 2 tablets by mouth Daily., Disp: , Rfl:     Ozempic, 2 MG/DOSE, 8 MG/3ML solution pen-injector, Inject 2 mg under the skin into the appropriate area as directed 1 (One) Time Per Week., Disp: , Rfl:     traZODone (DESYREL) 50 MG tablet, 2-3 po qhs for insomnia, Disp: 270 tablet, Rfl: 1        Follow Up:   Return in about 6 months (around 8/14/2025) for Annual physical with labs one week prior.    Farnaz Blount PA-C   [facility]     NOTE TO PATIENT: The 21st Century Cures Act makes medical notes like these available to patients in the interest of transparency. However, be advised this is a medical document. It is intended as peer to peer communication. It is written in medical language and may contain abbreviations or verbiage that are unfamiliar. It may appear blunt or direct. Medical documents are intended to carry relevant information, facts as evident, and the clinical opinion of the practitioner.

## 2025-02-15 LAB
ALBUMIN/CREAT UR: 16 MG/G CREAT (ref 0–29)
CREAT UR-MCNC: 164.1 MG/DL
MICROALBUMIN UR-MCNC: 26.6 UG/ML

## 2025-02-21 ENCOUNTER — OFFICE VISIT (OUTPATIENT)
Dept: BEHAVIORAL HEALTH | Facility: CLINIC | Age: 60
End: 2025-02-21
Payer: COMMERCIAL

## 2025-02-21 DIAGNOSIS — F32.A DEPRESSIVE DISORDER: Primary | ICD-10-CM

## 2025-02-21 DIAGNOSIS — F41.1 GENERALIZED ANXIETY DISORDER: ICD-10-CM

## 2025-02-21 PROCEDURE — 90834 PSYTX W PT 45 MINUTES: CPT | Performed by: SOCIAL WORKER

## 2025-02-21 NOTE — PROGRESS NOTES
Follow Up  Adult Note     Date:2025   Patient Name: Toma Martinez  : 1965   MRN: [id number]   Time IN: 10:15 am     Time OUT: 11:30 am      Referring Provider: Farnaz Blount PA    Chief Complaint:      ICD-10-CM ICD-9-CM   1. Depressive disorder  F32.A 311   2. Generalized anxiety disorder  F41.1 300.02      History of Present Illness:   Toma Martinez is a 59 y.o. female who presents to clinic today for Psychotherapy      Completed treatment plan today.    Reviewed homework from last week.     Subjective     Triggers: (Persons/Places/Things/Events/Thought/Emotions): Anxiety / stress     Medications:     Current Outpatient Medications:     busPIRone (BUSPAR) 10 MG tablet, Take 1 tablet by mouth 2 (Two) Times a Day., Disp: 180 tablet, Rfl: 1    meloxicam (MOBIC) 7.5 MG tablet, Take 1 tablet by mouth Daily., Disp: 90 tablet, Rfl: 0    metFORMIN ER (GLUCOPHAGE-XR) 500 MG 24 hr tablet, Take 2 tablets by mouth Daily., Disp: , Rfl:     Ozempic, 2 MG/DOSE, 8 MG/3ML solution pen-injector, Inject 2 mg under the skin into the appropriate area as directed 1 (One) Time Per Week., Disp: , Rfl:     traZODone (DESYREL) 50 MG tablet, 2-3 po qhs for insomnia, Disp: 270 tablet, Rfl: 1    Allergies:   Allergies   Allergen Reactions    Penicillins Hives       Objective     MENTAL STATUS EXAM   General Appearance:  Cleanly groomed and dressed  Eye Contact:  Good eye contact  Attitude:  Cooperative  Motor Activity:  Normal gait, posture  Muscle Strength:  Normal  Speech:  Normal rate, tone, volume  Language:  Spontaneous  Mood and affect:  Normal, pleasant  Thought Process:  Logical and goal-directed  Associations/ Thought Content:  No delusions  Hallucinations:  None  Suicidal Ideations:  Not present  Homicidal Ideation:  Not present  Sensorium:  Alert and clear  Orientation:  Person, place and time  Immediate Recall, Recent, and Remote Memory:  Intact  Attention Span/ Concentration:  Good  Fund of Knowledge:   Appropriate for age and educational level  Intellectual Functioning:  Above average  Insight:  Good  Judgement:  Good  Reliability:  Good  Impulse Control:  Good     SUICIDE RISK ASSESSMENT/CSSRS:  1. Does patient have thoughts of suicide? no  2. Does patient have intent for suicide? no  3. Does patient have a current plan for suicide? no    Assessment / Plan      Visit Diagnosis/Orders Placed This Visit:    ICD-10-CM ICD-9-CM   1. Depressive disorder  F32.A 311   2. Generalized anxiety disorder  F41.1 300.02      PLAN:  Safety: No acute safety concerns  Risk Assessment: Risk of self-harm acutely is low. Risk of self-harm chronically is also low, but could be further elevated in the event of treatment noncompliance and/or AODA.    Treatment Plan/ Short and Long Term Goals: Continue supportive psychotherapy efforts and medications as indicated. Treatment and medication options discussed during today's visit. Patient ackowledged and verbally consented to continue with current treatment plan and was educated on the importance of compliance with treatment and follow-up appointments. Patient seems reasonably able to adhere to treatment plan.      Assisted Patient in processing above session content; acknowledged and normalized patient’s thoughts, feelings, and concerns.  Rationalized patient thought process regarding Completed treatment plan. Reviewed homework       Allowed Patient to freely discuss issues  without interruption or judgement with unconditional positive regard, active listening skills, and empathy. Therapist provided a safe, confidential environment to facilitate the development of a positive therapeutic relationship and encouraged open, honest communication. Assisted Patient in identifying risk factors which would indicate the need for higher level of care including thoughts to harm self or others and/or self-harming behavior and encouraged Patient to contact this office, call 911, or present to the  nearest emergency room should any of these events occur. Discussed crisis intervention services and means to access. Patient adamantly and convincingly denies current suicidal or homicidal ideation or perceptual disturbance. Assisted Patient in processing session content; acknowledged and normalized Patient’s thoughts, feelings, and concerns by utilizing a person-centered approach in efforts to build appropriate rapport and a positive therapeutic relationship with open and honest communication.     Follow Up:   Return in about 1 week (around 2/28/2025) for Psychoterapy.    Francisco Javier Welch LCSW, [facility]

## 2025-02-21 NOTE — PLAN OF CARE
Client is therapy wise  She is insight oriented  She is motivated for treatment  She is compliant with recommendations from her providers

## 2025-02-28 ENCOUNTER — OFFICE VISIT (OUTPATIENT)
Dept: BEHAVIORAL HEALTH | Facility: CLINIC | Age: 60
End: 2025-02-28
Payer: COMMERCIAL

## 2025-02-28 DIAGNOSIS — Z86.59 HISTORY OF OCD (OBSESSIVE COMPULSIVE DISORDER): ICD-10-CM

## 2025-02-28 DIAGNOSIS — F41.1 GENERALIZED ANXIETY DISORDER: ICD-10-CM

## 2025-02-28 DIAGNOSIS — F32.A DEPRESSIVE DISORDER: Primary | ICD-10-CM

## 2025-02-28 PROCEDURE — 90834 PSYTX W PT 45 MINUTES: CPT | Performed by: SOCIAL WORKER

## 2025-02-28 NOTE — PROGRESS NOTES
Follow Up  Adult Note     Date:2025   Patient Name: Toma Martinez  : 1965   MRN: [id number]   Time IN: 9:00 am    Time OUT: 9:45      Referring Provider: Farnaz Blount PA    Chief Complaint:      ICD-10-CM ICD-9-CM   1. Depressive disorder  F32.A 311   2. Generalized anxiety disorder  F41.1 300.02   3. History of OCD (obsessive compulsive disorder)  Z86.59 V11.2      History of Present Illness:   Toma Martinez is a 59 y.o. female who presents to clinic today for Psychotherapy.    Very stressed  My boss told me I have to take a 2.00 pay cut  He often says I am his right hand person  He often says I am indispensable to the company  Medicaid has cut our funding  I am the only one asked to take a pay cut  He said it will only be around 2-3 months  Then he says I would make even more money  Non-revenue generating  Client feels like she' being taken advantage of  As if the boss knows I'll make concession    Subjective     Triggers: (Persons/Places/Things/Events/Thought/Emotions): Job related stress    Medications:   Current Outpatient Medications:     busPIRone (BUSPAR) 10 MG tablet, Take 1 tablet by mouth 2 (Two) Times a Day., Disp: 180 tablet, Rfl: 1    meloxicam (MOBIC) 7.5 MG tablet, Take 1 tablet by mouth Daily., Disp: 90 tablet, Rfl: 0    metFORMIN ER (GLUCOPHAGE-XR) 500 MG 24 hr tablet, Take 2 tablets by mouth Daily., Disp: , Rfl:     Ozempic, 2 MG/DOSE, 8 MG/3ML solution pen-injector, Inject 2 mg under the skin into the appropriate area as directed 1 (One) Time Per Week., Disp: , Rfl:     traZODone (DESYREL) 50 MG tablet, 2-3 po qhs for insomnia, Disp: 270 tablet, Rfl: 1    Allergies:   Allergies   Allergen Reactions    Penicillins Hives     Objective     MENTAL STATUS EXAM   General Appearance:  Cleanly groomed and dressed  Eye Contact:  Good eye contact  Attitude:  Cooperative  Motor Activity:  Normal gait, posture  Muscle Strength:  Normal  Speech:  Normal rate, tone, volume  Language:   Spontaneous  Mood and affect:  Normal, pleasant  Thought Process:  Logical and goal-directed  Associations/ Thought Content:  No delusions  Hallucinations:  None  Suicidal Ideations:  Not present  Homicidal Ideation:  Not present  Sensorium:  Alert and clear  Orientation:  Person, place and time  Immediate Recall, Recent, and Remote Memory:  Intact  Attention Span/ Concentration:  Good  Fund of Knowledge:  Appropriate for age and educational level  Intellectual Functioning:  Above average  Insight:  Good  Judgement:  Good  Reliability:  Good  Impulse Control:  Good     SUICIDE RISK ASSESSMENT/CSSRS:  1. Does patient have thoughts of suicide? no  2. Does patient have intent for suicide? no  3. Does patient have a current plan for suicide? no    Assessment / Plan      Visit Diagnosis/Orders Placed This Visit:    ICD-10-CM ICD-9-CM   1. Depressive disorder  F32.A 311   2. Generalized anxiety disorder  F41.1 300.02   3. History of OCD (obsessive compulsive disorder)  Z86.59 V11.2      PLAN:  Safety: No acute safety concerns  Risk Assessment: Risk of self-harm acutely is low. Risk of self-harm chronically is also low, but could be further elevated in the event of treatment noncompliance and/or AODA.    Treatment Plan/ Short and Long Term Goals: Continue supportive psychotherapy efforts and medications as indicated. Treatment and medication options discussed during today's visit. Patient ackowledged and verbally consented to continue with current treatment plan and was educated on the importance of compliance with treatment and follow-up appointments. Patient seems reasonably able to adhere to treatment plan.      Assisted Patient in processing above session content; acknowledged and normalized patient’s thoughts, feelings, and concerns.  Rationalized patient thought process regarding Reviewed and finalized T/P .      Allowed Patient to freely discuss issues  without interruption or judgement with unconditional positive  regard, active listening skills, and empathy. Therapist provided a safe, confidential environment to facilitate the development of a positive therapeutic relationship and encouraged open, honest communication. Assisted Patient in identifying risk factors which would indicate the need for higher level of care including thoughts to harm self or others and/or self-harming behavior and encouraged Patient to contact this office, call 911, or present to the nearest emergency room should any of these events occur. Discussed crisis intervention services and means to access. Patient adamantly and convincingly denies current suicidal or homicidal ideation or perceptual disturbance. Assisted Patient in processing session content; acknowledged and normalized Patient’s thoughts, feelings, and concerns by utilizing a person-centered approach in efforts to build appropriate rapport and a positive therapeutic relationship with open and honest communication.     Follow Up:   Return in about 1 week (around 3/7/2025) for Psychoterapy.    Francisco Javier Welch LCSW, [facility]

## 2025-02-28 NOTE — PLAN OF CARE
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  Client is attentive to homework

## 2025-03-14 ENCOUNTER — OFFICE VISIT (OUTPATIENT)
Dept: BEHAVIORAL HEALTH | Facility: CLINIC | Age: 60
End: 2025-03-14
Payer: COMMERCIAL

## 2025-03-14 DIAGNOSIS — F32.A DEPRESSIVE DISORDER: Primary | ICD-10-CM

## 2025-03-14 DIAGNOSIS — Z86.59 HISTORY OF OCD (OBSESSIVE COMPULSIVE DISORDER): ICD-10-CM

## 2025-03-14 PROCEDURE — 90834 PSYTX W PT 45 MINUTES: CPT | Performed by: SOCIAL WORKER
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
## 2025-03-21 ENCOUNTER — OFFICE VISIT (OUTPATIENT)
Dept: BEHAVIORAL HEALTH | Facility: CLINIC | Age: 60
End: 2025-03-21
Payer: COMMERCIAL

## 2025-03-21 DIAGNOSIS — Z86.59 HISTORY OF OCD (OBSESSIVE COMPULSIVE DISORDER): ICD-10-CM

## 2025-03-21 DIAGNOSIS — F41.1 GENERALIZED ANXIETY DISORDER: ICD-10-CM

## 2025-03-21 DIAGNOSIS — F32.A DEPRESSIVE DISORDER: Primary | ICD-10-CM

## 2025-03-21 PROCEDURE — 90834 PSYTX W PT 45 MINUTES: CPT | Performed by: SOCIAL WORKER

## 2025-03-21 NOTE — PROGRESS NOTES
"     Follow Up  Adult Note     Date:2025   Patient Name: Toma Martinez  : 1965   MRN: [id number]   Time IN: 9:00 am    Time OUT: 9:45 am     Referring Provider: Farnaz Blount PA    Chief Complaint:      ICD-10-CM ICD-9-CM   1. Depressive disorder  F32.A 311   2. Generalized anxiety disorder  F41.1 300.02   3. History of OCD (obsessive compulsive disorder)  Z86.59 V11.2      History of Present Illness:   Toma Martinez is a 59 y.o. female who presents to clinic today for Psychotherapy.    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     Found a spot on his Dad's lungs  Has an appointment with a pulmonologist on   May be Covid related    Not much time to think about herself because of work    \"I put the face on\"  \"Nobody would know about my depression & anxiety\"   says I have brick wall up  I've had friends who have done me wrong    Diagnosed with skin cancer in     Subjective     Triggers: (Persons/Places/Things/Events/Thought/Emotions): N/A    Medications:   Current Outpatient Medications:     busPIRone (BUSPAR) 10 MG tablet, Take 1 tablet by mouth 2 (Two) Times a Day., Disp: 180 tablet, Rfl: 1    meloxicam (MOBIC) 7.5 MG tablet, Take 1 tablet by mouth Daily., Disp: 90 tablet, Rfl: 0    metFORMIN ER (GLUCOPHAGE-XR) 500 MG 24 hr tablet, Take 2 tablets by mouth Daily., Disp: , Rfl:     Ozempic, 2 MG/DOSE, 8 MG/3ML solution pen-injector, Inject 2 mg under the skin into the appropriate area as directed 1 (One) " Time Per Week., Disp: , Rfl:     traZODone (DESYREL) 50 MG tablet, 2-3 po qhs for insomnia, Disp: 270 tablet, Rfl: 1    Allergies:   Allergies   Allergen Reactions    Penicillins Hives     Objective     MENTAL STATUS EXAM   General Appearance:  Cleanly groomed and dressed  Eye Contact:  Good eye contact  Attitude:  Cooperative  Motor Activity:  Normal gait, posture  Muscle Strength:  Normal  Speech:  Normal rate, tone, volume  Language:  Spontaneous  Mood and affect:  Depressed and anxious  Thought Process:  Logical and goal-directed  Associations/ Thought Content:  No delusions  Hallucinations:  None  Suicidal Ideations:  Not present  Homicidal Ideation:  Not present  Sensorium:  Alert and clear  Orientation:  Person, place, time and situation  Immediate Recall, Recent, and Remote Memory:  Intact  Attention Span/ Concentration:  Good  Fund of Knowledge:  Appropriate for age and educational level  Intellectual Functioning:  Above average  Insight:  Good  Judgement:  Good  Reliability:  Good  Impulse Control:  Good     SUICIDE RISK ASSESSMENT/CSSRS:  1. Does patient have thoughts of suicide? no  2. Does patient have intent for suicide? no  3. Does patient have a current plan for suicide? no    Assessment / Plan      Visit Diagnosis/Orders Placed This Visit:    ICD-10-CM ICD-9-CM   1. Depressive disorder  F32.A 311   2. Generalized anxiety disorder  F41.1 300.02   3. History of OCD (obsessive compulsive disorder)  Z86.59 V11.2      PLAN:  Safety: No acute safety concerns  Risk Assessment: Risk of self-harm acutely is low. Risk of self-harm chronically is also low, but could be further elevated in the event of treatment noncompliance and/or AODA.    Treatment Plan/ Short and Long Term Goals: Continue supportive psychotherapy efforts and medications as indicated. Treatment and medication options discussed during today's visit. Patient ackowledged and verbally consented to continue with current treatment plan and was  "educated on the importance of compliance with treatment and follow-up appointments. Patient seems reasonably able to adhere to treatment plan.      Assisted Patient in processing above session content; acknowledged and normalized patient’s thoughts, feelings, and concerns.  Rationalized patient thought process regarding Challenged client to tear down the wall between her \"Happy\" face and her \"Real\" self.      Allowed Patient to freely discuss issues  without interruption or judgement with unconditional positive regard, active listening skills, and empathy. Therapist provided a safe, confidential environment to facilitate the development of a positive therapeutic relationship and encouraged open, honest communication. Assisted Patient in identifying risk factors which would indicate the need for higher level of care including thoughts to harm self or others and/or self-harming behavior and encouraged Patient to contact this office, call 911, or present to the nearest emergency room should any of these events occur. Discussed crisis intervention services and means to access. Patient adamantly and convincingly denies current suicidal or homicidal ideation or perceptual disturbance. Assisted Patient in processing session content; acknowledged and normalized Patient’s thoughts, feelings, and concerns by utilizing a person-centered approach in efforts to build appropriate rapport and a positive therapeutic relationship with open and honest communication.     Follow Up:   Return in about 1 week (around 3/28/2025) for Psychoterapy.    Francisco Javier Welch LCSW, [facility]   "

## 2025-03-28 ENCOUNTER — OFFICE VISIT (OUTPATIENT)
Dept: BEHAVIORAL HEALTH | Facility: CLINIC | Age: 60
End: 2025-03-28
Payer: COMMERCIAL

## 2025-03-28 DIAGNOSIS — F33.0 MILD EPISODE OF RECURRENT MAJOR DEPRESSIVE DISORDER: Primary | ICD-10-CM

## 2025-03-28 DIAGNOSIS — F41.1 GENERALIZED ANXIETY DISORDER: ICD-10-CM

## 2025-03-28 DIAGNOSIS — Z86.59 HISTORY OF OCD (OBSESSIVE COMPULSIVE DISORDER): ICD-10-CM

## 2025-03-28 PROCEDURE — 90834 PSYTX W PT 45 MINUTES: CPT | Performed by: SOCIAL WORKER

## 2025-03-28 NOTE — PROGRESS NOTES
Follow Up  Adult Note     Date:2025   Patient Name: Toma Martienz  : 1965   MRN: [id number]   Time IN: 9:00 am    Time OUT: 9:45 am      Referring Provider: Farnaz Blount PA    Chief Complaint:      ICD-10-CM ICD-9-CM   1. Mild episode of recurrent major depressive disorder  F33.0 296.31   2. Generalized anxiety disorder  F41.1 300.02   3. History of OCD (obsessive compulsive disorder)  Z86.59 V11.2        History of Present Illness:   Toma Martinez is a 59 y.o. female who presents to clinic today for Psychotherapy.    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    I am a very stressed person  The closer to , the more nervous I get  My herstory to obsess and dread something new    Tried methods of distraction such as: I've tried meditation and yoga    Subjective     Triggers: (Persons/Places/Things/Events/Thought/Emotions): job related stress     Medications:   Current Outpatient Medications:     busPIRone (BUSPAR) 10 MG tablet, Take 1 tablet by mouth 2 (Two) Times a Day., Disp: 180 tablet, Rfl: 1    meloxicam (MOBIC) 7.5 MG tablet, Take 1 tablet by mouth Daily., Disp: 90 tablet, Rfl: 0    metFORMIN ER (GLUCOPHAGE-XR) 500 MG 24 hr tablet, Take 2 tablets by mouth Daily., Disp: , Rfl:     Ozempic, 2 MG/DOSE, 8 MG/3ML solution pen-injector, Inject 2 mg under the skin into the appropriate area as directed 1 (One) Time Per Week., Disp: , Rfl:     traZODone (DESYREL) 50 MG tablet, 2-3 po qhs for insomnia, Disp: 270 tablet, Rfl: 1    Allergies:   Allergies    Allergen Reactions    Penicillins Hives     Objective     MENTAL STATUS EXAM   General Appearance:  Cleanly groomed and dressed  Eye Contact:  Good eye contact  Attitude:  Cooperative  Motor Activity:  Normal gait, posture  Muscle Strength:  Normal  Speech:  Normal rate, tone, volume  Language:  Spontaneous  Mood and affect:  Anxious  Thought Process:  Logical  Associations/ Thought Content:  No delusions  Hallucinations:  None  Suicidal Ideations:  Not present  Homicidal Ideation:  Not present  Sensorium:  Alert and clear  Orientation:  Place, time and person  Immediate Recall, Recent, and Remote Memory:  Intact  Attention Span/ Concentration:  Good  Fund of Knowledge:  Appropriate for age and educational level  Intellectual Functioning:  Above average  Insight:  Good  Judgement:  Good  Reliability:  Good  Impulse Control:  Good     SUICIDE RISK ASSESSMENT/CSSRS:  1. Does patient have thoughts of suicide? no  2. Does patient have intent for suicide? no  3. Does patient have a current plan for suicide? no    Assessment / Plan      Visit Diagnosis/Orders Placed This Visit:    ICD-10-CM ICD-9-CM   1. Mild episode of recurrent major depressive disorder  F33.0 296.31   2. Generalized anxiety disorder  F41.1 300.02   3. History of OCD (obsessive compulsive disorder)  Z86.59 V11.2      PLAN:  Safety: No acute safety concerns  Risk Assessment: Risk of self-harm acutely is low. Risk of self-harm chronically is also low, but could be further elevated in the event of treatment noncompliance and/or AODA.    Treatment Plan/ Short and Long Term Goals: Continue supportive psychotherapy efforts and medications as indicated. Treatment and medication options discussed during today's visit. Patient ackowledged and verbally consented to continue with current treatment plan and was educated on the importance of compliance with treatment and follow-up appointments. Patient seems reasonably able to adhere to treatment plan.       Assisted Patient in processing above session content; acknowledged and normalized patient’s thoughts, feelings, and concerns.  Rationalized patient thought process regarding Methods of entering into and listening to the voice of her anxiety.  Conceptualization her anxiety. Polar Opposite Deep Breathing     Allowed Patient to freely discuss issues  without interruption or judgement with unconditional positive regard, active listening skills, and empathy. Therapist provided a safe, confidential environment to facilitate the development of a positive therapeutic relationship and encouraged open, honest communication. Assisted Patient in identifying risk factors which would indicate the need for higher level of care including thoughts to harm self or others and/or self-harming behavior and encouraged Patient to contact this office, call 911, or present to the nearest emergency room should any of these events occur. Discussed crisis intervention services and means to access. Patient adamantly and convincingly denies current suicidal or homicidal ideation or perceptual disturbance. Assisted Patient in processing session content; acknowledged and normalized Patient’s thoughts, feelings, and concerns by utilizing a person-centered approach in efforts to build appropriate rapport and a positive therapeutic relationship with open and honest communication.     Follow Up:   Return in about 1 week (around 4/4/2025) for Psychoterapy.    Francisco Javier Welch, EVELYNW, [facility]

## 2025-04-11 ENCOUNTER — OFFICE VISIT (OUTPATIENT)
Dept: BEHAVIORAL HEALTH | Facility: CLINIC | Age: 60
End: 2025-04-11
Payer: COMMERCIAL

## 2025-04-11 DIAGNOSIS — Z86.59 HISTORY OF OCD (OBSESSIVE COMPULSIVE DISORDER): ICD-10-CM

## 2025-04-11 DIAGNOSIS — F32.A DEPRESSIVE DISORDER: Primary | ICD-10-CM

## 2025-04-11 DIAGNOSIS — F41.1 GENERALIZED ANXIETY DISORDER: ICD-10-CM

## 2025-04-11 PROCEDURE — 90834 PSYTX W PT 45 MINUTES: CPT | Performed by: SOCIAL WORKER
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
## 2025-04-18 ENCOUNTER — OFFICE VISIT (OUTPATIENT)
Dept: BEHAVIORAL HEALTH | Facility: CLINIC | Age: 60
End: 2025-04-18
Payer: COMMERCIAL

## 2025-04-18 ENCOUNTER — RESULTS FOLLOW-UP (OUTPATIENT)
Dept: FAMILY MEDICINE CLINIC | Facility: CLINIC | Age: 60
End: 2025-04-18
Payer: COMMERCIAL

## 2025-04-18 DIAGNOSIS — F33.0 MILD EPISODE OF RECURRENT MAJOR DEPRESSIVE DISORDER: Primary | ICD-10-CM

## 2025-04-18 DIAGNOSIS — Z86.59 HISTORY OF OCD (OBSESSIVE COMPULSIVE DISORDER): ICD-10-CM

## 2025-04-18 PROCEDURE — 90834 PSYTX W PT 45 MINUTES: CPT | Performed by: SOCIAL WORKER

## 2025-04-18 NOTE — PROGRESS NOTES
Follow Up  Adult Note     Date:2025   Patient Name: Toma Martinez  : 1965   MRN: [id number]   Time IN: 9:00 am    Time OUT: 9:45 am     Referring Provider: Farnaz Blount PA    Chief Complaint:      ICD-10-CM ICD-9-CM   1. Mild episode of recurrent major depressive disorder  F33.0 296.31   2. History of OCD (obsessive compulsive disorder)  Z86.59 V11.2      History of Present Illness:   Toma Martinez is a 59 y.o. female who presents to clinic today for Psychotherapy.    , Juan Manuel   Parents - Lesli & Domitila   Nephew - Ramiro, 31 y/o (Autistic)  Niece - Wendie , 24 y/o   Great nephew - Wilfred - 4 y/o   Older sister - Kanchan Castellano -  after 8 months  Client - Toma Castellano  Sister - Evelin   Parents - Ikie & Domitila    - Nathan     Doing well since last session   Getting settled into mew position     Self care  -needs to get a manicure  -needs to get a pedicure    Mom can't do anything  Dad can't lift a lot    Work 10 hour days  Last seven weeks at parents on the weekends    My sister goes over everyday  -she cooks and cleans    Feels fulfilled I still have both parents  They sacrificed for me so I gladly sacrifice for them  My responsibility  My privilege  Keeping them out of a nursing home    I go, go, go for 10 hours and when I get home I'm tired     is my rock  With me in my cancer - chemo and radiation with first treatment  Second treatment was a different kind of treatment  Mom, sister, niece have been there during my therapy    Cancer taught me a lot  Live each day to the fullest  It helped with my spiritual  I have to be ready     Rapid speech    Subjective     Triggers: (Persons/Places/Things/Events/Thought/Emotions): Over working     Medications:   Current Outpatient Medications:     busPIRone (BUSPAR) 10 MG tablet, Take 1 tablet by mouth 2 (Two) Times a Day., Disp: 180 tablet, Rfl: 1    meloxicam (MOBIC) 7.5 MG tablet, Take 1 tablet by mouth Daily., Disp: 90 tablet,  Rfl: 0    metFORMIN ER (GLUCOPHAGE-XR) 500 MG 24 hr tablet, Take 2 tablets by mouth Daily., Disp: , Rfl:     Ozempic, 2 MG/DOSE, 8 MG/3ML solution pen-injector, Inject 2 mg under the skin into the appropriate area as directed 1 (One) Time Per Week., Disp: , Rfl:     traZODone (DESYREL) 50 MG tablet, 2-3 po qhs for insomnia, Disp: 270 tablet, Rfl: 1    Allergies:   Allergies   Allergen Reactions    Penicillins Hives     Objective     MENTAL STATUS EXAM   General Appearance:  Cleanly groomed and dressed  Eye Contact:  Good eye contact  Attitude:  Cooperative  Motor Activity:  Normal gait, posture  Muscle Strength:  Normal  Speech:  Normal rate, tone, volume  Language:  Spontaneous  Mood and affect:  Normal, pleasant  Thought Process:  Logical and goal-directed  Associations/ Thought Content:  No delusions  Hallucinations:  None  Suicidal Ideations:  Not present  Homicidal Ideation:  Not present  Sensorium:  Clear and alert  Orientation:  Person, place and time  Immediate Recall, Recent, and Remote Memory:  Intact  Attention Span/ Concentration:  Good  Fund of Knowledge:  Appropriate for age and educational level  Intellectual Functioning:  Above average  Insight:  Good  Judgement:  Good  Reliability:  Good  Impulse Control:  Good     SUICIDE RISK ASSESSMENT/CSSRS:  1. Does patient have thoughts of suicide? no  2. Does patient have intent for suicide? no  3. Does patient have a current plan for suicide? no    Assessment / Plan      Visit Diagnosis/Orders Placed This Visit:    ICD-10-CM ICD-9-CM   1. Mild episode of recurrent major depressive disorder  F33.0 296.31   2. History of OCD (obsessive compulsive disorder)  Z86.59 V11.2        PLAN:  Safety: No acute safety concerns  Risk Assessment: Risk of self-harm acutely is low. Risk of self-harm chronically is also low, but could be further elevated in the event of treatment noncompliance and/or AODA.    Treatment Plan/ Short and Long Term Goals: Continue supportive  psychotherapy efforts and medications as indicated. Treatment and medication options discussed during today's visit. Patient ackowledged and verbally consented to continue with current treatment plan and was educated on the importance of compliance with treatment and follow-up appointments. Patient seems reasonably able to adhere to treatment plan.      Assisted Patient in processing above session content; acknowledged and normalized patient’s thoughts, feelings, and concerns.  Rationalized patient thought process regarding work / life balance. Self-care as care for others. Privilege of serving      *Why are you working so hard?  What is the benefit to you?    Allowed Patient to freely discuss issues  without interruption or judgement with unconditional positive regard, active listening skills, and empathy. Therapist provided a safe, confidential environment to facilitate the development of a positive therapeutic relationship and encouraged open, honest communication. Assisted Patient in identifying risk factors which would indicate the need for higher level of care including thoughts to harm self or others and/or self-harming behavior and encouraged Patient to contact this office, call 911, or present to the nearest emergency room should any of these events occur. Discussed crisis intervention services and means to access. Patient adamantly and convincingly denies current suicidal or homicidal ideation or perceptual disturbance. Assisted Patient in processing session content; acknowledged and normalized Patient’s thoughts, feelings, and concerns by utilizing a person-centered approach in efforts to build appropriate rapport and a positive therapeutic relationship with open and honest communication.     Follow Up:   Return in about 1 week (around 4/25/2025) for Psychoterapy.    Francisco Javier Welch LCSW, [facility]

## 2025-04-18 NOTE — LETTER
Toma Martinez  [address] [place] [address]    April 18, 2025     Dear Ms. Martinez:    Below are the results from your recent visit:    Normal screening mammogram, repeat again in one year.              If you have any questions or concerns, please don't hesitate to call.         Sincerely,        DAVI Bains

## 2025-04-21 ENCOUNTER — TELEPHONE (OUTPATIENT)
Dept: FAMILY MEDICINE CLINIC | Facility: CLINIC | Age: 60
End: 2025-04-21
Payer: COMMERCIAL

## 2025-04-21 DIAGNOSIS — F41.1 GENERALIZED ANXIETY DISORDER: Primary | ICD-10-CM

## 2025-04-21 RX ORDER — BUSPIRONE HYDROCHLORIDE 15 MG/1
15 TABLET ORAL 3 TIMES DAILY
Qty: 90 TABLET | Refills: 0 | Status: SHIPPED | OUTPATIENT
Start: 2025-04-21

## 2025-04-21 NOTE — TELEPHONE ENCOUNTER
Caller: Toma Martinez    Relationship: Self    Best call back number: [phone]     Which medication are you concerned about: busPIRone (BUSPAR) 10 MG tablet     Who prescribed you this medication: JYACOB BECK    When did you start taking this medication: YEARS    What are your concerns: PT STATED THE MEDICATION IS NOT HELPING HER. STATED SHE HAD A VERY BAD WEEKEND. PLEASE CALL PT ASAP TO LET HER KNOW IF THERE IS SOMETHING ELSE SHE CAN TRY.

## 2025-04-23 NOTE — TELEPHONE ENCOUNTER
Name: Toma Martinez      Relationship: Self      Best Callback Number: [phone] (home)         HUB PROVIDED THE RELAY MESSAGE FROM THE OFFICE      PATIENT: VOICED UNDERSTANDING AND HAS NO FURTHER QUESTIONS AT THIS TIME    ADDITIONAL INFORMATION:  PATIENT UPSET THAT WE DIDN'T CALL AND LET HER KNOW ABOUT THE INCREASE IN MEDICATION AND SHE HAS BEEN CONTINUING TO HAVE THIS ISSUE DUE TO NO ONE CALLING HER. ALSO COMPLAINED OF HEART PALPITATIONS, WT TO OFFICE

## 2025-04-24 ENCOUNTER — TELEPHONE (OUTPATIENT)
Dept: FAMILY MEDICINE CLINIC | Facility: CLINIC | Age: 60
End: 2025-04-24
Payer: COMMERCIAL

## 2025-04-24 NOTE — TELEPHONE ENCOUNTER
HUB TO RELAY    CALLED AND LEFT PT A VM. CALLED PT TO GET HER SET UP FOR AN APPT WITH GAVIN EITHER IN OFFICE OR VIA [facility] TO DISCUSS MEDICATION CHANGES.

## 2025-04-25 ENCOUNTER — OFFICE VISIT (OUTPATIENT)
Dept: BEHAVIORAL HEALTH | Facility: CLINIC | Age: 60
End: 2025-04-25
Payer: COMMERCIAL

## 2025-04-25 DIAGNOSIS — Z86.59 HISTORY OF OCD (OBSESSIVE COMPULSIVE DISORDER): ICD-10-CM

## 2025-04-25 DIAGNOSIS — F32.A DEPRESSIVE DISORDER: Primary | ICD-10-CM

## 2025-04-25 DIAGNOSIS — F41.1 GENERALIZED ANXIETY DISORDER: ICD-10-CM

## 2025-04-25 PROCEDURE — 90834 PSYTX W PT 45 MINUTES: CPT | Performed by: SOCIAL WORKER

## 2025-04-25 NOTE — PROGRESS NOTES
"     Follow Up  Adult Note     Date:2025   Patient Name: Toma Martinez  : 1965   MRN: [id number]   Time IN: 9:00 am    Time OUT: 9:45 M     Referring Provider: Farnaz Blount PA    Chief Complaint:      ICD-10-CM ICD-9-CM   1. Depressive disorder  F32.A 311   2. Generalized anxiety disorder  F41.1 300.02   3. History of OCD (obsessive compulsive disorder)  Z86.59 V11.2        History of Present Illness:   Toma Martinez is a 59 y.o. female who presents to clinic today for Psychotherapy.    , Juan Manuel   Parents - Lesli & Domitila   Nephew - Ramiro, 31 y/o (Autistic)  Niece - Wendie , 24 y/o   Great nephew / sister's grandson - Wilfred - 4 y/o   Older sister - Kanchan Castellano -  after 8 months  Client - Tmoa Castellano  Sister - Evelin   Parents - Ikie & Domitila    - Nathan Palencia argument with sister  Will not forgive me  Pushing and shoving  Parents don't want anymore family functions  \"No one knows what that cake meant to me\"    My sister is slow to forgive  This was bad  She called me mean and hateful  Friends called me out of concern  I am worried about me too  Can't stop crying    Evelin has always tried to belittle me  Has a strong need to be in control  She would not let the issue go  I said ugly things to her in response  Something snapped inside me    My dad said I should have let it go  I blame myself too  I destroyed my whole family    Emotions got the better of me*  Evelin, leading up to the event, telling me what to bring and what not to bring    I worked so hard on that cake  (But, it was something bigger than the cake)  She has a deep hatred for me  She said she wishes she was dead  She has said that to her daughter, her  and her son  My sister is jealous of me, and her daughter's , and her daughter  My sister 's is addicted to pain killers  He was half-way for robbing a bank    Subjective     Triggers: (Persons/Places/Things/Events/Thought/Emotions): Family " conflict     Medications:   Current Outpatient Medications:     busPIRone (BUSPAR) 15 MG tablet, Take 1 tablet by mouth 3 (Three) Times a Day., Disp: 90 tablet, Rfl: 0    meloxicam (MOBIC) 7.5 MG tablet, Take 1 tablet by mouth Daily., Disp: 90 tablet, Rfl: 0    metFORMIN ER (GLUCOPHAGE-XR) 500 MG 24 hr tablet, Take 2 tablets by mouth Daily., Disp: , Rfl:     Ozempic, 2 MG/DOSE, 8 MG/3ML solution pen-injector, Inject 2 mg under the skin into the appropriate area as directed 1 (One) Time Per Week., Disp: , Rfl:     traZODone (DESYREL) 50 MG tablet, 2-3 po qhs for insomnia, Disp: 270 tablet, Rfl: 1    Allergies:   Allergies   Allergen Reactions    Penicillins Hives     Objective     MENTAL STATUS EXAM   General Appearance:  Cleanly groomed and dressed  Eye Contact:  Good eye contact  Attitude:  Cooperative  Motor Activity:  Normal gait, posture  Muscle Strength:  Normal  Speech:  Normal rate, tone, volume  Mood and affect:  Depressed, anxious, frustrated and angry  Thought Process:  Goal-directed and logical  Associations/ Thought Content:  No delusions  Hallucinations:  None  Suicidal Ideations:  Not present  Homicidal Ideation:  Not present  Sensorium:  Alert and clear  Orientation:  Person, place and time  Immediate Recall, Recent, and Remote Memory:  Intact  Attention Span/ Concentration:  Good  Fund of Knowledge:  Appropriate for age and educational level  Intellectual Functioning:  Above average  Insight:  Good  Judgement:  Good  Reliability:  Good  Impulse Control:  Good     SUICIDE RISK ASSESSMENT/CSSRS:  1. Does patient have thoughts of suicide? no  2. Does patient have intent for suicide? no  3. Does patient have a current plan for suicide? no    Assessment / Plan      Visit Diagnosis/Orders Placed This Visit:    ICD-10-CM ICD-9-CM   1. Depressive disorder  F32.A 311   2. Generalized anxiety disorder  F41.1 300.02   3. History of OCD (obsessive compulsive disorder)  Z86.59 V11.2      PLAN:  Safety: No acute  safety concerns  Risk Assessment: Risk of self-harm acutely is low. Risk of self-harm chronically is also low, but could be further elevated in the event of treatment noncompliance and/or AODA.    Treatment Plan/ Short and Long Term Goals: Continue supportive psychotherapy efforts and medications as indicated. Treatment and medication options discussed during today's visit. Patient ackowledged and verbally consented to continue with current treatment plan and was educated on the importance of compliance with treatment and follow-up appointments. Patient seems reasonably able to adhere to treatment plan.      Assisted Patient in processing above session content; acknowledged and normalized patient’s thoughts, feelings, and concerns.  Rationalized patient thought process regarding challenged catastrophic thinking / claiming authority / affirmative statements about her position.      Allowed Patient to freely discuss issues  without interruption or judgement with unconditional positive regard, active listening skills, and empathy. Therapist provided a safe, confidential environment to facilitate the development of a positive therapeutic relationship and encouraged open, honest communication. Assisted Patient in identifying risk factors which would indicate the need for higher level of care including thoughts to harm self or others and/or self-harming behavior and encouraged Patient to contact this office, call 911, or present to the nearest emergency room should any of these events occur. Discussed crisis intervention services and means to access. Patient adamantly and convincingly denies current suicidal or homicidal ideation or perceptual disturbance. Assisted Patient in processing session content; acknowledged and normalized Patient’s thoughts, feelings, and concerns by utilizing a person-centered approach in efforts to build appropriate rapport and a positive therapeutic relationship with open and honest  communication.     Follow Up:   Return in about 1 week (around 5/2/2025) for Psychoterapy.    EVELYN RodriguezW, [facility]

## 2025-05-02 ENCOUNTER — OFFICE VISIT (OUTPATIENT)
Dept: BEHAVIORAL HEALTH | Facility: CLINIC | Age: 60
End: 2025-05-02
Payer: COMMERCIAL

## 2025-05-02 DIAGNOSIS — Z86.59 HISTORY OF OCD (OBSESSIVE COMPULSIVE DISORDER): ICD-10-CM

## 2025-05-02 DIAGNOSIS — F32.A DEPRESSIVE DISORDER: Primary | ICD-10-CM

## 2025-05-02 PROCEDURE — 90834 PSYTX W PT 45 MINUTES: CPT | Performed by: SOCIAL WORKER

## 2025-05-02 NOTE — PROGRESS NOTES
Follow Up  Adult Note     Date:2025   Patient Name: Toma Martinez  : 1965   MRN: [id number]   Time IN: 9:45 am    Time OUT: 10:30 am      Referring Provider: Farnaz Blount PA    Chief Complaint:      ICD-10-CM ICD-9-CM   1. Depressive disorder  F32.A 311   2. History of OCD (obsessive compulsive disorder)  Z86.59 V11.2        History of Present Illness:   Toma Martinez is a 59 y.o. female who presents to clinic today for Psychotherapy.    , Juan Manuel   Parents - Lesli & Domitila   Nephew - Ramiro, 33 y/o (Autistic)  Niece - Wendie , 26 y/o   Great nephew / sister's grandson - Wilfred - 6 y/o   Older sister - Kanchan Castellano -  after 8 months  Client - Toma Castellano  Sister - Evelin   Parents - Ikie & Domitila    - Nathan      Visited at mother's house  Was cordial with her sister, Evelin, who was there  Sister has kidney stones  Client is feeling Better   Praying about the situation     Not as depressed or crying like I was  I did apologize to her  She said I need time  She said I'm tired of everyone blaming me  -her daughter, client's niece   -sister tries to control her daughter's life  -a lot of problems are brought on by this sister  -the way she treats other people    Still crying   Emotions are not controlling me  Taken a step back and removed emotionality  Looking at things more objectively     I can hold on to grudges for years  I can remember things from my childhood    Prepared and delivered worksheets of  -forgiveness  -communication  -ABCDE model    Subjective     Triggers: (Persons/Places/Things/Events/Thought/Emotions): Family issues     Medications:   Current Outpatient Medications:     busPIRone (BUSPAR) 15 MG tablet, Take 1 tablet by mouth 3 (Three) Times a Day., Disp: 90 tablet, Rfl: 0    meloxicam (MOBIC) 7.5 MG tablet, Take 1 tablet by mouth Daily., Disp: 90 tablet, Rfl: 0    metFORMIN ER (GLUCOPHAGE-XR) 500 MG 24 hr tablet, Take 2 tablets by mouth Daily., Disp: ,  Rfl:     Ozempic, 2 MG/DOSE, 8 MG/3ML solution pen-injector, Inject 2 mg under the skin into the appropriate area as directed 1 (One) Time Per Week., Disp: , Rfl:     traZODone (DESYREL) 50 MG tablet, 2-3 po qhs for insomnia, Disp: 270 tablet, Rfl: 1    Allergies:   Allergies   Allergen Reactions    Penicillins Hives     Objective     MENTAL STATUS EXAM   General Appearance:  Cleanly groomed and dressed  Eye Contact:  Good eye contact  Attitude:  Cooperative  Motor Activity:  Normal gait, posture  Muscle Strength:  Normal  Speech:  Normal rate, tone, volume  Language:  Spontaneous  Mood and affect:  Normal, pleasant  Thought Process:  Logical and goal-directed  Associations/ Thought Content:  No delusions  Hallucinations:  None  Suicidal Ideations:  Not present  Homicidal Ideation:  Not present  Sensorium:  Alert and clear  Orientation:  Person, place, time and situation  Immediate Recall, Recent, and Remote Memory:  Intact  Attention Span/ Concentration:  Good  Fund of Knowledge:  Appropriate for age and educational level  Intellectual Functioning:  Above average  Insight:  Good  Judgement:  Good  Reliability:  Good  Impulse Control:  Good     SUICIDE RISK ASSESSMENT/CSSRS:  1. Does patient have thoughts of suicide? no  2. Does patient have intent for suicide? no  3. Does patient have a current plan for suicide? no    Assessment / Plan      Visit Diagnosis/Orders Placed This Visit:    ICD-10-CM ICD-9-CM   1. Depressive disorder  F32.A 311   2. History of OCD (obsessive compulsive disorder)  Z86.59 V11.2      PLAN:  Safety: No acute safety concerns  Risk Assessment: Risk of self-harm acutely is low. Risk of self-harm chronically is also low, but could be further elevated in the event of treatment noncompliance and/or AODA.    Treatment Plan/ Short and Long Term Goals: Continue supportive psychotherapy efforts and medications as indicated. Treatment and medication options discussed during today's visit. Patient  ackowledged and verbally consented to continue with current treatment plan and was educated on the importance of compliance with treatment and follow-up appointments. Patient seems reasonably able to adhere to treatment plan.      Assisted Patient in processing above session content; acknowledged and normalized patient’s thoughts, feelings, and concerns.  Rationalized patient thought process regarding communication theory / forgiveness / ABCDE model.  Look up scriptures on forgiveness.       Allowed Patient to freely discuss issues  without interruption or judgement with unconditional positive regard, active listening skills, and empathy. Therapist provided a safe, confidential environment to facilitate the development of a positive therapeutic relationship and encouraged open, honest communication. Assisted Patient in identifying risk factors which would indicate the need for higher level of care including thoughts to harm self or others and/or self-harming behavior and encouraged Patient to contact this office, call 911, or present to the nearest emergency room should any of these events occur. Discussed crisis intervention services and means to access. Patient adamantly and convincingly denies current suicidal or homicidal ideation or perceptual disturbance. Assisted Patient in processing session content; acknowledged and normalized Patient’s thoughts, feelings, and concerns by utilizing a person-centered approach in efforts to build appropriate rapport and a positive therapeutic relationship with open and honest communication.     Follow Up:   Return in about 2 weeks (around 5/16/2025) for Psychoterapy.    Francisco Javier Welch LCSW, [facility]

## 2025-05-16 ENCOUNTER — OFFICE VISIT (OUTPATIENT)
Dept: BEHAVIORAL HEALTH | Facility: CLINIC | Age: 60
End: 2025-05-16
Payer: COMMERCIAL

## 2025-05-16 DIAGNOSIS — F32.A DEPRESSIVE DISORDER: Primary | ICD-10-CM

## 2025-05-16 DIAGNOSIS — F41.1 GENERALIZED ANXIETY DISORDER: ICD-10-CM

## 2025-05-16 DIAGNOSIS — Z86.59 HISTORY OF OCD (OBSESSIVE COMPULSIVE DISORDER): ICD-10-CM

## 2025-05-16 PROCEDURE — 90834 PSYTX W PT 45 MINUTES: CPT | Performed by: SOCIAL WORKER

## 2025-05-16 NOTE — TREATMENT PLAN
"Multi-Disciplinary Problems (from Behavioral Health Treatment Plan)      Active Problems       Problem: Anxiety  Start Date: 05/16/25      Problem Details: The patient self-scales this problem as a 7 with 10 being the worst. \"It's high because I still worked up over things.\"          Goal Priority Start Date Expected End Date End Date    Patient will develop and implement behavioral and cognitive strategies to reduce anxiety and irrational fears. -- 05/16/25 11/14/25 --    Goal Details: Progress toward goal:  The patient self-scales their progress related to this goal as a 5 with 10 being the worst. \"I feel like I'm getting better but there some things I don't feel like I can ever overcome.\"  I have \"Issues such as: job related anxiety, my boss can go from zero to ten in not time at all, I never know what kind of day I am going to have, the anxiety increases every time he walks through the door. 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 I need to develop thicker skin because I take my work personally.\"         Goal Intervention Frequency Start Date End Date    Help patient explore past emotional issues in relation to present anxiety. Weekly 05/16/25 --    Intervention Details: Duration of treatment until remission of symptoms. She has identified specific goals:   Reduce over all level of anxiety and associated symptoms. Such as anxiousness and stress reaction to something the boss says.  -hyper sensitivity to perceived threatening and/or off putting comments from the boss   -emotional self-regulation to perceived antagonistic comments from boss  -demeaning, dismissive, condescending        Goal Intervention Frequency Start Date End Date    Help patient develop an awareness of their cognitive and physical responses to anxiety. Weekly 05/16/25 --    Intervention Details: Duration of treatment until remission of symptoms. We will use CBT and DBT and Mindfulness and julia based counseling.        "         Problem: Depression  Start Date: 05/16/25      Problem Details: The patient self-scales this problem as a 4 with 10 being the worst.          Goal Priority Start Date Expected End Date End Date    Patient will demonstrate the ability to initiate new constructive life skills outside of sessions on a consistent basis. -- 05/16/25 11/14/25 --    Goal Details: Progress toward goal:  The patient self-scales their progress related to this goal as a 1 with 10 being the worst.        Goal Intervention Frequency Start Date End Date    Assist patient in setting attainable activities of daily living goals. PRN 05/16/25 --      Goal Intervention Frequency Start Date End Date    Provide education about depression Weekly 05/16/25 --    Intervention Details: Duration of treatment until remission of symptoms.   Identify triggers  Practice CBT interventions using ABCDE, thought restructuring, assertive communication, emotional self-regulation         Goal Intervention Frequency Start Date End Date    Assist patient in developing healthy coping strategies. Weekly 05/16/25 --    Intervention Details: Duration of treatment until remission of symptoms.   Practicing assertive communication in the work place  Setting limits and boundaries with acceptable and unacceptable communication on the jub                 Problem: Obsessive/Compulsive  Start Date: 05/16/25      Problem Details: The patient self-scales this problem as a 10 with 10 being the worst.           Goal Priority Start Date Expected End Date End Date    Patient will decrease frequency of obsessive/compulsive behaviors that interfere with daily functioning. -- 05/16/25 11/14/25 --    Goal Details: Progress toward goal:  The patient self-scales their progress related to this goal as a 1 with 10 being the worst.  Identify OCD tendencies  Methods of managing obsessions in the moment         Goal Intervention Frequency Start Date End Date    Assist patient in learning  thought stopping and self talk techniques that cognitively and behaviorally reduce obsessions and compulsions. Weekly 05/16/25 --    Intervention Details: Duration of treatment until remission of symptoms.                        Reviewed By       Francisco Javier Welch, [facility] 05/16/25 0784                     I have discussed and reviewed this treatment plan with the patient.

## 2025-05-16 NOTE — PROGRESS NOTES
Follow Up  Adult Note     Date:2025   Patient Name: Toma Martinez  : 1965   MRN: [id number]   Time IN: 9:00 am     Time OUT: 9:45 am      Referring Provider: Farnaz Blount PA    Chief Complaint:      ICD-10-CM ICD-9-CM   1. Depressive disorder  F32.A 311   2. History of OCD (obsessive compulsive disorder)  Z86.59 V11.2   3. Generalized anxiety disorder  F41.1 300.02     History of Present Illness:   Toma Martinez is a 60 y.o. female who presents to clinic today for Psychotherapy.    Had a good birthday   took her on a surprise birthday trip  -shopping  -dinner with friends    Cooked on Mother's Day  Sister did not come to Mother's Day dinner  Sister said she wants a day to herself  Sister was pleasant with me about it  She says she takes care of mom everyday and wanted a break  Sister and I are talking    Re-did treatment plan     Subjective     Triggers: (Persons/Places/Things/Events/Thought/Emotions): On-the-job stress     Medications:   Current Outpatient Medications:     busPIRone (BUSPAR) 15 MG tablet, Take 1 tablet by mouth 3 (Three) Times a Day., Disp: 90 tablet, Rfl: 0    meloxicam (MOBIC) 7.5 MG tablet, Take 1 tablet by mouth Daily., Disp: 90 tablet, Rfl: 0    metFORMIN ER (GLUCOPHAGE-XR) 500 MG 24 hr tablet, Take 2 tablets by mouth Daily., Disp: , Rfl:     Ozempic, 2 MG/DOSE, 8 MG/3ML solution pen-injector, Inject 2 mg under the skin into the appropriate area as directed 1 (One) Time Per Week., Disp: , Rfl:     traZODone (DESYREL) 50 MG tablet, 2-3 po qhs for insomnia, Disp: 270 tablet, Rfl: 1    Allergies:   Allergies   Allergen Reactions    Penicillins Hives     Objective     MENTAL STATUS EXAM   General Appearance:  Cleanly groomed and dressed  Eye Contact:  Good eye contact  Attitude:  Cooperative  Motor Activity:  Normal gait, posture  Muscle Strength:  Normal  Speech:  Normal rate, tone, volume  Language:  Spontaneous  Mood and affect:  Normal, pleasant  Thought Process:   Logical and goal-directed  Associations/ Thought Content:  No delusions  Hallucinations:  None  Suicidal Ideations:  Not present  Homicidal Ideation:  Not present  Sensorium:  Alert and clear  Orientation:  Person, place, time and situation  Immediate Recall, Recent, and Remote Memory:  Intact  Attention Span/ Concentration:  Good  Fund of Knowledge:  Appropriate for age and educational level  Intellectual Functioning:  Above average  Insight:  Good  Judgement:  Good  Reliability:  Good  Impulse Control:  Good     SUICIDE RISK ASSESSMENT/CSSRS:  1. Does patient have thoughts of suicide? no  2. Does patient have intent for suicide? no  3. Does patient have a current plan for suicide? no    Assessment / Plan      Visit Diagnosis/Orders Placed This Visit:    ICD-10-CM ICD-9-CM   1. Depressive disorder  F32.A 311   2. History of OCD (obsessive compulsive disorder)  Z86.59 V11.2   3. Generalized anxiety disorder  F41.1 300.02        PLAN:  Safety: No acute safety concerns  Risk Assessment: Risk of self-harm acutely is low. Risk of self-harm chronically is also low, but could be further elevated in the event of treatment noncompliance and/or AODA.    Treatment Plan/ Short and Long Term Goals: Continue supportive psychotherapy efforts and medications as indicated. Treatment and medication options discussed during today's visit. Patient ackowledged and verbally consented to continue with current treatment plan and was educated on the importance of compliance with treatment and follow-up appointments. Patient seems reasonably able to adhere to treatment plan.      Assisted Patient in processing above session content; acknowledged and normalized patient’s thoughts, feelings, and concerns.  Rationalized patient thought process regarding emotional regulation vs. Emotional dysregulation.      Allowed Patient to freely discuss issues  without interruption or judgement with unconditional positive regard, active listening skills,  and empathy. Therapist provided a safe, confidential environment to facilitate the development of a positive therapeutic relationship and encouraged open, honest communication. Assisted Patient in identifying risk factors which would indicate the need for higher level of care including thoughts to harm self or others and/or self-harming behavior and encouraged Patient to contact this office, call 911, or present to the nearest emergency room should any of these events occur. Discussed crisis intervention services and means to access. Patient adamantly and convincingly denies current suicidal or homicidal ideation or perceptual disturbance. Assisted Patient in processing session content; acknowledged and normalized Patient’s thoughts, feelings, and concerns by utilizing a person-centered approach in efforts to build appropriate rapport and a positive therapeutic relationship with open and honest communication.     Follow Up:   Return if symptoms worsen or fail to improve, for Psychoterapy.    Francisco Javier Welch LCSW, [facility]

## 2025-05-18 DIAGNOSIS — F41.1 GENERALIZED ANXIETY DISORDER: ICD-10-CM

## 2025-05-18 DIAGNOSIS — M15.0 PRIMARY OSTEOARTHRITIS INVOLVING MULTIPLE JOINTS: ICD-10-CM

## 2025-05-21 RX ORDER — BUSPIRONE HYDROCHLORIDE 15 MG/1
15 TABLET ORAL 3 TIMES DAILY
Qty: 90 TABLET | Refills: 0 | Status: SHIPPED | OUTPATIENT
Start: 2025-05-21 | End: 2025-05-23

## 2025-05-21 RX ORDER — MELOXICAM 7.5 MG/1
7.5 TABLET ORAL DAILY
Qty: 90 TABLET | Refills: 0 | Status: SHIPPED | OUTPATIENT
Start: 2025-05-21

## 2025-05-21 NOTE — TELEPHONE ENCOUNTER
Rx Refill Note    Requested Prescriptions     Pending Prescriptions Disp Refills    busPIRone (BUSPAR) 15 MG tablet [Pharmacy Med Name: BUSPIRONE HCL 15 MG TABLET] 90 tablet 0     Sig: TAKE 1 TABLET BY MOUTH THREE TIMES A DAY    meloxicam (MOBIC) 7.5 MG tablet [Pharmacy Med Name: MELOXICAM 7.5 MG TABLET] 90 tablet 0     Sig: TAKE 1 TABLET BY MOUTH EVERY DAY        Last office visit with prescribing clinician: 2/14/2025      Next office visit with prescribing clinician: 5/23/2025   Last labs:   Last refill: needs   Pharmacy (be sure to add in Epic). correct

## 2025-05-23 ENCOUNTER — OFFICE VISIT (OUTPATIENT)
Dept: FAMILY MEDICINE CLINIC | Facility: CLINIC | Age: 60
End: 2025-05-23
Payer: COMMERCIAL

## 2025-05-23 VITALS
SYSTOLIC BLOOD PRESSURE: 116 MMHG | BODY MASS INDEX: 29.07 KG/M2 | DIASTOLIC BLOOD PRESSURE: 72 MMHG | HEART RATE: 93 BPM | WEIGHT: 185.2 LBS | OXYGEN SATURATION: 97 % | HEIGHT: 67 IN

## 2025-05-23 DIAGNOSIS — F41.1 GENERALIZED ANXIETY DISORDER: Primary | ICD-10-CM

## 2025-05-23 RX ORDER — PROPRANOLOL HYDROCHLORIDE 10 MG/1
10 TABLET ORAL 3 TIMES DAILY PRN
Qty: 45 TABLET | Refills: 0 | Status: SHIPPED | OUTPATIENT
Start: 2025-05-23

## 2025-05-23 NOTE — ASSESSMENT & PLAN NOTE
Options discussed, impairment with benzos and antihistamines.  BuSpar actually caused a paradoxical increase in anxiety.  Will try very low-dose of propranolol which is a beta-blocker which blocks the fight or flight response.  She will continue with counseling.  Much time today spent in supportive counseling.  Patient knows that she is way too hard on herself and is work to identify strategies to identify and manage earlier in the process.

## 2025-05-23 NOTE — PROGRESS NOTES
Patient Office Visit      Patient Name: Toma Martinez  : 1965   MRN: [id number]     Chief Complaint:    Chief Complaint   Patient presents with    Anxiety       History of Present Illness: Toma Martinez is a 60 y.o. female who is here today for follow-up.  BuSpar makes her anxiety worse.  She is in counseling and this is helping.  She wants something that she can take just as needed that will help with the physical symptoms of anxiety and panic such as racing heart.    Subjective      Review of Systems:   Review of Systems   Respiratory:  Negative for shortness of breath.    Cardiovascular:  Positive for palpitations. Negative for chest pain.   Gastrointestinal:  Negative for nausea.   Neurological:  Negative for dizziness and confusion.        Past Medical History:   Past Medical History:   Diagnosis Date    Arthritis     Breast cyst, left     Cancer 10/20/2020    Skin cancer    Cholelithiasis     Depression     DRUG THERAPY    Depressive disorder     Hypertension     Tonsillitis        Past Surgical History:   Past Surgical History:   Procedure Laterality Date    BREAST CYST EXCISION Left     BENIGN    CHOLECYSTECTOMY  2009    HYSTERECTOMY  2003    TONSILLECTOMY         Family History:   Family History   Problem Relation Age of Onset    Hypertension Mother     Clotting disorder Father         BLOOD CLOT    Coronary artery disease Maternal Grandmother     Heart disease Maternal Grandmother     Coronary artery disease Maternal Grandfather     Heart disease Maternal Grandfather     Stroke Paternal Grandmother     Diabetes Sister     Alcohol abuse Paternal Grandfather        Social History:   Social History     Socioeconomic History    Marital status:    Tobacco Use    Smoking status: Never     Passive exposure: Never    Smokeless tobacco: Never   Vaping Use    Vaping status: Never Used   Substance and Sexual Activity    Alcohol use: Not Currently    Drug use: Never    Sexual activity: Defer  "      Allergies:   Allergies   Allergen Reactions    Penicillins Hives       Objective     Physical Exam:  Vital Signs:   Vitals:    05/23/25 1035   BP: 116/72   BP Location: Left arm   Patient Position: Sitting   Pulse: 93   SpO2: 97%   Weight: 84 kg (185 lb 3.2 oz)   Height: 170.2 cm (67\")     Body mass index is 29.01 kg/m².           Physical Exam  Constitutional:       Appearance: Normal appearance.   Cardiovascular:      Rate and Rhythm: Normal rate and regular rhythm.   Pulmonary:      Effort: Pulmonary effort is normal.      Breath sounds: Normal breath sounds.   Musculoskeletal:      Cervical back: Normal range of motion and neck supple.   Neurological:      Mental Status: She is alert and oriented to person, place, and time.   Psychiatric:         Mood and Affect: Mood normal.         Behavior: Behavior normal.         Thought Content: Thought content normal.         Judgment: Judgment normal.         Procedures    Assessment / Plan      Assessment/Plan:   Diagnoses and all orders for this visit:    1. Generalized anxiety disorder (Primary)  Assessment & Plan:  Options discussed, impairment with benzos and antihistamines.  BuSpar actually caused a paradoxical increase in anxiety.  Will try very low-dose of propranolol which is a beta-blocker which blocks the fight or flight response.  She will continue with counseling.  Much time today spent in supportive counseling.  Patient knows that she is way too hard on herself and is work to identify strategies to identify and manage earlier in the process.    Orders:  -     propranolol (INDERAL) 10 MG tablet; Take 1 tablet by mouth 3 (Three) Times a Day As Needed (anxiety and panic).  Dispense: 45 tablet; Refill: 0          Medications:     Current Outpatient Medications:     meloxicam (MOBIC) 7.5 MG tablet, TAKE 1 TABLET BY MOUTH EVERY DAY, Disp: 90 tablet, Rfl: 0    metFORMIN ER (GLUCOPHAGE-XR) 500 MG 24 hr tablet, Take 2 tablets by mouth Daily., Disp: , Rfl:     " Ozempic, 2 MG/DOSE, 8 MG/3ML solution pen-injector, Inject 2 mg under the skin into the appropriate area as directed 1 (One) Time Per Week., Disp: , Rfl:     traZODone (DESYREL) 50 MG tablet, 2-3 po qhs for insomnia, Disp: 270 tablet, Rfl: 1    propranolol (INDERAL) 10 MG tablet, Take 1 tablet by mouth 3 (Three) Times a Day As Needed (anxiety and panic)., Disp: 45 tablet, Rfl: 0    I spent 45 minutes caring for Toma on this date of service. This time includes time spent by me in the following activities:preparing for the visit, obtaining and/or reviewing a separately obtained history, performing a medically appropriate examination and/or evaluation , counseling and educating the patient/family/caregiver, ordering medications, tests, or procedures, and documenting information in the medical record    Follow Up:   No follow-ups on file.    Farnaz Blount PA-C   [facility]     NOTE TO PATIENT: The 21st Century Cures Act makes medical notes like these available to patients in the interest of transparency. However, be advised this is a medical document. It is intended as peer to peer communication. It is written in medical language and may contain abbreviations or verbiage that are unfamiliar. It may appear blunt or direct. Medical documents are intended to carry relevant information, facts as evident, and the clinical opinion of the practitioner.   Answers submitted by the patient for this visit:  Anxiety (Submitted on 5/22/2025)  Chief Complaint: Anxiety  Visit: follow-up  Frequency: constantly  Severity: moderate  depressed mood: Yes  dry mouth: No  excessive worry: No  insomnia: Yes  irritability: No  malaise/fatigue: Yes  obsessions: No  Sleep quality: poor  Hours of sleep per night: 5 Hours  Medication compliance: %  Side effects: palpitations

## 2025-06-13 ENCOUNTER — OFFICE VISIT (OUTPATIENT)
Dept: BEHAVIORAL HEALTH | Facility: CLINIC | Age: 60
End: 2025-06-13
Payer: COMMERCIAL

## 2025-06-13 DIAGNOSIS — F33.0 MILD EPISODE OF RECURRENT MAJOR DEPRESSIVE DISORDER: Primary | ICD-10-CM

## 2025-06-13 PROCEDURE — 90834 PSYTX W PT 45 MINUTES: CPT | Performed by: SOCIAL WORKER

## 2025-06-13 NOTE — PROGRESS NOTES
"     Follow Up  Adult Note     Date:2025   Patient Name: Toma Martinez  : 1965   MRN: [id number]   Time IN: 9:00 am    Time OUT: 9:45 am      Referring Provider: Farnaz Blount PA    Chief Complaint:      ICD-10-CM ICD-9-CM   1. Mild episode of recurrent major depressive disorder  F33.0 296.31      History of Present Illness:   Toma Martinez is a 60 y.o. female who presents to clinic today for Psychotherapy.    , Juan Manuel   Parents - Iknathalia & Domitila   Nephew - Ramiro, 31 y/o (Autistic)  Niece - Wendie , 26 y/o   Great nephew / sister's grandson - Wilfred - 4 y/o   Older sister - Kanchan Castellano -  after 8 months  Client - Toma Castellano  Sister - Evelin   Parents - Ikie & Domitila    - Nathan     Things are better with my sister  Collaborating about mother's welfare  Mother stays with my sister    Mother is becoming more \"Snippy\"  Now towards me  14 weeks post hip replacement    Last two months she has been snippy towards everyone, including me  History of dementia / alzheimer's disease on maternal side of the family     Things are better at work  New position  More set with my responsibility   Pre-authorizations now required for all phases of treatment    Using ASAM and DAST at the clinic  KHIE access  MARI    Subjective     Triggers: (Persons/Places/Things/Events/Thought/Emotions): N/A    Medications:   Current Outpatient Medications:     meloxicam (MOBIC) 7.5 MG tablet, TAKE 1 TABLET BY MOUTH EVERY DAY, Disp: 90 tablet, Rfl: 0    metFORMIN ER (GLUCOPHAGE-XR) 500 MG 24 hr tablet, Take 2 tablets by mouth Daily., Disp: , Rfl:     Ozempic, 2 MG/DOSE, 8 MG/3ML solution pen-injector, Inject 2 mg under the skin into the appropriate area as directed 1 (One) Time Per Week., Disp: , Rfl:     propranolol (INDERAL) 10 MG tablet, Take 1 tablet by mouth 3 (Three) Times a Day As Needed (anxiety and panic)., Disp: 45 tablet, Rfl: 0    traZODone (DESYREL) 50 MG tablet, 2-3 po qhs for insomnia, Disp: 270 " tablet, Rfl: 1    Allergies:   Allergies   Allergen Reactions    Penicillins Hives     Objective     MENTAL STATUS EXAM   General Appearance:  Cleanly groomed and dressed  Eye Contact:  Good eye contact  Attitude:  Cooperative  Motor Activity:  Normal gait, posture  Muscle Strength:  Normal  Speech:  Normal rate, tone, volume  Language:  Spontaneous  Mood and affect:  Normal, pleasant  Thought Process:  Logical and goal-directed  Associations/ Thought Content:  No delusions  Hallucinations:  None  Suicidal Ideations:  Not present  Homicidal Ideation:  Not present  Sensorium:  Alert and clear  Orientation:  Person, place, time and situation  Immediate Recall, Recent, and Remote Memory:  Intact  Attention Span/ Concentration:  Good  Fund of Knowledge:  Appropriate for age and educational level  Intellectual Functioning:  Above average  Insight:  Good  Judgement:  Good  Reliability:  Good  Impulse Control:  Good     SUICIDE RISK ASSESSMENT/CSSRS:  1. Does patient have thoughts of suicide? no  2. Does patient have intent for suicide? no  3. Does patient have a current plan for suicide? no    Assessment / Plan      Visit Diagnosis/Orders Placed This Visit:    ICD-10-CM ICD-9-CM   1. Mild episode of recurrent major depressive disorder  F33.0 296.31      PLAN:  Safety: No acute safety concerns  Risk Assessment: Risk of self-harm acutely is low. Risk of self-harm chronically is also low, but could be further elevated in the event of treatment noncompliance and/or AODA.    Treatment Plan/ Short and Long Term Goals: Continue supportive psychotherapy efforts and medications as indicated. Treatment and medication options discussed during today's visit. Patient ackowledged and verbally consented to continue with current treatment plan and was educated on the importance of compliance with treatment and follow-up appointments. Patient seems reasonably able to adhere to treatment plan.      Assisted Patient in processing above  session content; acknowledged and normalized patient’s thoughts, feelings, and concerns.  Rationalized patient thought process regarding Discussed options for ongoing treatment..      Allowed Patient to freely discuss issues  without interruption or judgement with unconditional positive regard, active listening skills, and empathy. Therapist provided a safe, confidential environment to facilitate the development of a positive therapeutic relationship and encouraged open, honest communication. Assisted Patient in identifying risk factors which would indicate the need for higher level of care including thoughts to harm self or others and/or self-harming behavior and encouraged Patient to contact this office, call 911, or present to the nearest emergency room should any of these events occur. Discussed crisis intervention services and means to access. Patient adamantly and convincingly denies current suicidal or homicidal ideation or perceptual disturbance. Assisted Patient in processing session content; acknowledged and normalized Patient’s thoughts, feelings, and concerns by utilizing a person-centered approach in efforts to build appropriate rapport and a positive therapeutic relationship with open and honest communication.     Follow Up:   Return if symptoms worsen or fail to improve, for Psychoterapy.    Francisco Javier Welch, LCSW, [facility]

## 2025-07-25 ENCOUNTER — OFFICE VISIT (OUTPATIENT)
Dept: BEHAVIORAL HEALTH | Facility: CLINIC | Age: 60
End: 2025-07-25
Payer: COMMERCIAL

## 2025-07-25 DIAGNOSIS — F41.1 GENERALIZED ANXIETY DISORDER: ICD-10-CM

## 2025-07-25 DIAGNOSIS — F33.1 MAJOR DEPRESSIVE DISORDER, RECURRENT EPISODE, MODERATE: Primary | ICD-10-CM

## 2025-07-25 DIAGNOSIS — Z86.59 HISTORY OF OCD (OBSESSIVE COMPULSIVE DISORDER): ICD-10-CM

## 2025-07-25 NOTE — PROGRESS NOTES
"     Follow Up Adult Note     Date:2025   Patient Name: Toma Martinez  : 1965   MRN: [id number]   Time IN: 9:06 am    Time OUT: 10:05 am     Referring Provider: Farnaz Blount PA    Chief Complaint:      ICD-10-CM ICD-9-CM   1. Major depressive disorder, recurrent episode, moderate  F33.1 296.32   2. Generalized anxiety disorder  F41.1 300.02   3. History of OCD (obsessive compulsive disorder)  Z86.59 V11.2        History of Present Illness:   Toma Martinez is a 60 y.o., , employed female who is being seen today for scheduled Psychotherapy session. Mood reported as \"good I guess\" with somewhat anxious but overall engaged and cooperative affect. Patient presented with some mild intermittent anxiety that improved as session progressed but remained overall calm, cooperative, engaged, and pleasant with provider. Patient denied any current SI/HI/AVH. Patient was previously working with another provider at this practice and this represents first session with this provider. Patient presents with symptoms consistent of MDD and YUMIKO reporting experiencing persistent feelings of sadness and worthlessness, worry, nervousness and low self esteem, all impacting her daily life, work performance, and engaging in enjoyable activities.     \"I feel like I wear a mask-I'm smiling on the outside but I'm always down and worry on the inside\"  \"I don't feel good about myself and have always struggled with depression\"  \"I overthink and worry about things before they even happen\"  \"My moms health isn't that good and that has been stressful\"  \"I have a lot of work stress-my boss has gotten better but he is difficult to work with\"      Subjective     PHQ-9 Depression Screening  PHQ-9 Total Score:  Not completed at this time     YUMIKO-7   Not completed at this time    Patient's Support Network Includes:  , mother, extended family, and friends    Functional Status: Moderate impairment     Progress toward goal: Not at " "goal    Prognosis: Good with Ongoing Treatment     Medications:     Current Outpatient Medications:     meloxicam (MOBIC) 7.5 MG tablet, TAKE 1 TABLET BY MOUTH EVERY DAY, Disp: 90 tablet, Rfl: 0    metFORMIN ER (GLUCOPHAGE-XR) 500 MG 24 hr tablet, Take 2 tablets by mouth Daily., Disp: , Rfl:     Ozempic, 2 MG/DOSE, 8 MG/3ML solution pen-injector, Inject 2 mg under the skin into the appropriate area as directed 1 (One) Time Per Week., Disp: , Rfl:     propranolol (INDERAL) 10 MG tablet, Take 1 tablet by mouth 3 (Three) Times a Day As Needed (anxiety and panic)., Disp: 45 tablet, Rfl: 0    traZODone (DESYREL) 50 MG tablet, 2-3 po qhs for insomnia, Disp: 270 tablet, Rfl: 1    Allergies:   Allergies   Allergen Reactions    Penicillins Hives       Objective     Mental Status Exam:   MENTAL STATUS EXAM   General Appearance:  Cleanly groomed and dressed and well developed  Eye Contact:  Good eye contact  Attitude:  Cooperative and polite  Motor Activity:  Normal gait, posture  Muscle Strength:  Normal  Speech:  Normal rate, tone, volume  Language:  Spontaneous  Mood and affect:  Other  Other Comment:  Mood reported as \"good I guess\" with somewhat anxious but overall engaged and cooperative affect.  Hopelessness:  2  Loneliness: Denies  Thought Process:  Logical and goal-directed  Associations/ Thought Content:  No delusions  Hallucinations:  None  Suicidal Ideations:  Not present  Homicidal Ideation:  Not present  Sensorium:  Alert and clear  Orientation:  Person, place, time and situation  Immediate Recall, Recent, and Remote Memory:  Intact  Attention Span/ Concentration:  Good  Fund of Knowledge:  Appropriate for age and educational level  Intellectual Functioning:  Average range  Insight:  Good  Judgement:  Good  Reliability:  Good  Impulse Control:  Good       Assessment / Plan      Visit Diagnosis/Orders Placed This Visit:    ICD-10-CM ICD-9-CM   1. Major depressive disorder, recurrent episode, moderate  F33.1 296.32 "   2. Generalized anxiety disorder  F41.1 300.02   3. History of OCD (obsessive compulsive disorder)  Z86.59 V11.2        PLAN:  Safety: No acute safety concerns  Risk Assessment: Risk of self-harm acutely is low. Risk of self-harm chronically is also low, but could be further elevated in the event of treatment noncompliance and/or AODA.    Treatment Plan/Goals: Continue supportive psychotherapy efforts and medications as indicated. Treatment and medication options discussed during today's visit. Patient ackowledged and verbally consented to continue with current treatment plan and was educated on the importance of compliance with treatment and follow-up appointments. Patient seems reasonably able to adhere to treatment plan.      Assisted Patient in processing above session content; acknowledged and normalized patient’s thoughts, feelings, and concerns.  Rationalized patient thought process regarding past and current symptom burdens and thoughts related to starting psychotherapy. Engaged patient in open ended and specific questioning to gather information/history of presenting concerns to assess for strengths, weaknesses, trauma, and risks. Provided psychoeducation on presenting symptom burdens and discussed expectations of treatment. Patient remained engaged and receptive to therapeutic feedback while voicing overall motivation for change.    Allowed Patient to freely discuss issues  without interruption or judgement with unconditional positive regard, active listening skills, and empathy. Therapist provided a safe, confidential environment to facilitate the development of a positive therapeutic relationship and encouraged open, honest communication. Assisted Patient in identifying risk factors which would indicate the need for higher level of care including thoughts to harm self or others and/or self-harming behavior and encouraged Patient to contact this office, call 911, or present to the nearest emergency room  should any of these events occur. Discussed crisis intervention services and means to access. Patient adamantly and convincingly denies current suicidal or homicidal ideation or perceptual disturbance. Assisted Patient in processing session content; acknowledged and normalized Patient’s thoughts, feelings, and concerns by utilizing a person-centered approach in efforts to build appropriate rapport and a positive therapeutic relationship with open and honest communication. .     Follow Up:   Return in about 1 week (around 8/1/2025) for Therapy session.    [facility]  [facility]

## 2025-08-06 ENCOUNTER — TELEPHONE (OUTPATIENT)
Dept: FAMILY MEDICINE CLINIC | Facility: CLINIC | Age: 60
End: 2025-08-06
Payer: COMMERCIAL

## 2025-08-07 DIAGNOSIS — N18.31 TYPE 2 DIABETES MELLITUS WITH STAGE 3A CHRONIC KIDNEY DISEASE, WITHOUT LONG-TERM CURRENT USE OF INSULIN: ICD-10-CM

## 2025-08-07 DIAGNOSIS — E03.9 ACQUIRED HYPOTHYROIDISM: ICD-10-CM

## 2025-08-07 DIAGNOSIS — Z13.220 SCREENING FOR HYPERLIPIDEMIA: ICD-10-CM

## 2025-08-07 DIAGNOSIS — N18.31 STAGE 3A CHRONIC KIDNEY DISEASE: Primary | ICD-10-CM

## 2025-08-07 DIAGNOSIS — E11.22 TYPE 2 DIABETES MELLITUS WITH STAGE 3A CHRONIC KIDNEY DISEASE, WITHOUT LONG-TERM CURRENT USE OF INSULIN: ICD-10-CM

## 2025-08-07 DIAGNOSIS — Z00.00 GENERAL MEDICAL EXAM: ICD-10-CM

## 2025-08-07 DIAGNOSIS — Z79.899 HIGH RISK MEDICATION USE: ICD-10-CM

## 2025-08-08 ENCOUNTER — OFFICE VISIT (OUTPATIENT)
Dept: BEHAVIORAL HEALTH | Facility: CLINIC | Age: 60
End: 2025-08-08
Payer: COMMERCIAL

## 2025-08-08 ENCOUNTER — LAB (OUTPATIENT)
Dept: FAMILY MEDICINE CLINIC | Facility: CLINIC | Age: 60
End: 2025-08-08
Payer: COMMERCIAL

## 2025-08-08 DIAGNOSIS — F41.1 GENERALIZED ANXIETY DISORDER: Primary | ICD-10-CM

## 2025-08-08 DIAGNOSIS — N18.31 TYPE 2 DIABETES MELLITUS WITH STAGE 3A CHRONIC KIDNEY DISEASE, WITHOUT LONG-TERM CURRENT USE OF INSULIN: ICD-10-CM

## 2025-08-08 DIAGNOSIS — N18.31 STAGE 3A CHRONIC KIDNEY DISEASE: ICD-10-CM

## 2025-08-08 DIAGNOSIS — Z00.00 GENERAL MEDICAL EXAM: ICD-10-CM

## 2025-08-08 DIAGNOSIS — Z13.220 SCREENING FOR HYPERLIPIDEMIA: ICD-10-CM

## 2025-08-08 DIAGNOSIS — E03.9 ACQUIRED HYPOTHYROIDISM: ICD-10-CM

## 2025-08-08 DIAGNOSIS — F33.1 MAJOR DEPRESSIVE DISORDER, RECURRENT EPISODE, MODERATE: ICD-10-CM

## 2025-08-08 DIAGNOSIS — Z79.899 HIGH RISK MEDICATION USE: ICD-10-CM

## 2025-08-08 DIAGNOSIS — E11.22 TYPE 2 DIABETES MELLITUS WITH STAGE 3A CHRONIC KIDNEY DISEASE, WITHOUT LONG-TERM CURRENT USE OF INSULIN: ICD-10-CM

## 2025-08-09 LAB
25(OH)D3+25(OH)D2 SERPL-MCNC: 35.8 NG/ML (ref 30–100)
ALBUMIN SERPL-MCNC: 4.3 G/DL (ref 3.8–4.9)
ALP SERPL-CCNC: 103 IU/L (ref 44–121)
ALT SERPL-CCNC: 11 IU/L (ref 0–32)
AST SERPL-CCNC: 15 IU/L (ref 0–40)
BASOPHILS # BLD AUTO: 0 X10E3/UL (ref 0–0.2)
BASOPHILS NFR BLD AUTO: 1 %
BILIRUB SERPL-MCNC: 0.4 MG/DL (ref 0–1.2)
BUN SERPL-MCNC: 10 MG/DL (ref 8–27)
BUN/CREAT SERPL: 9 (ref 12–28)
CALCIUM SERPL-MCNC: 9.9 MG/DL (ref 8.7–10.3)
CHLORIDE SERPL-SCNC: 106 MMOL/L (ref 96–106)
CHOLEST SERPL-MCNC: 151 MG/DL (ref 100–199)
CK SERPL-CCNC: 66 U/L (ref 32–182)
CO2 SERPL-SCNC: 21 MMOL/L (ref 20–29)
CREAT SERPL-MCNC: 1.16 MG/DL (ref 0.57–1)
EGFRCR SERPLBLD CKD-EPI 2021: 54 ML/MIN/1.73
EOSINOPHIL # BLD AUTO: 0 X10E3/UL (ref 0–0.4)
EOSINOPHIL NFR BLD AUTO: 1 %
ERYTHROCYTE [DISTWIDTH] IN BLOOD BY AUTOMATED COUNT: 12.9 % (ref 11.7–15.4)
FOLATE SERPL-MCNC: 6.7 NG/ML
GLOBULIN SER CALC-MCNC: 2.4 G/DL (ref 1.5–4.5)
GLUCOSE SERPL-MCNC: 76 MG/DL (ref 70–99)
HBA1C MFR BLD: 5.1 % (ref 4.8–5.6)
HCT VFR BLD AUTO: 43.2 % (ref 34–46.6)
HDLC SERPL-MCNC: 54 MG/DL
HGB BLD-MCNC: 13.6 G/DL (ref 11.1–15.9)
IMM GRANULOCYTES # BLD AUTO: 0 X10E3/UL (ref 0–0.1)
IMM GRANULOCYTES NFR BLD AUTO: 0 %
LDLC SERPL CALC-MCNC: 78 MG/DL (ref 0–99)
LYMPHOCYTES # BLD AUTO: 1.5 X10E3/UL (ref 0.7–3.1)
LYMPHOCYTES NFR BLD AUTO: 30 %
MCH RBC QN AUTO: 29.8 PG (ref 26.6–33)
MCHC RBC AUTO-ENTMCNC: 31.5 G/DL (ref 31.5–35.7)
MCV RBC AUTO: 95 FL (ref 79–97)
MONOCYTES # BLD AUTO: 0.3 X10E3/UL (ref 0.1–0.9)
MONOCYTES NFR BLD AUTO: 6 %
NEUTROPHILS # BLD AUTO: 3.2 X10E3/UL (ref 1.4–7)
NEUTROPHILS NFR BLD AUTO: 62 %
PLATELET # BLD AUTO: 307 X10E3/UL (ref 150–450)
POTASSIUM SERPL-SCNC: 4.4 MMOL/L (ref 3.5–5.2)
PROT SERPL-MCNC: 6.7 G/DL (ref 6–8.5)
RBC # BLD AUTO: 4.57 X10E6/UL (ref 3.77–5.28)
SODIUM SERPL-SCNC: 141 MMOL/L (ref 134–144)
T4 FREE SERPL-MCNC: 1.35 NG/DL (ref 0.82–1.77)
TRIGL SERPL-MCNC: 102 MG/DL (ref 0–149)
TSH SERPL DL<=0.005 MIU/L-ACNC: 3.27 UIU/ML (ref 0.45–4.5)
VIT B12 SERPL-MCNC: 156 PG/ML (ref 232–1245)
VLDLC SERPL CALC-MCNC: 19 MG/DL (ref 5–40)
WBC # BLD AUTO: 5.2 X10E3/UL (ref 3.4–10.8)

## 2025-08-15 ENCOUNTER — OFFICE VISIT (OUTPATIENT)
Dept: FAMILY MEDICINE CLINIC | Facility: CLINIC | Age: 60
End: 2025-08-15
Payer: COMMERCIAL

## 2025-08-15 ENCOUNTER — OFFICE VISIT (OUTPATIENT)
Dept: BEHAVIORAL HEALTH | Facility: CLINIC | Age: 60
End: 2025-08-15
Payer: COMMERCIAL

## 2025-08-15 VITALS
HEIGHT: 67 IN | SYSTOLIC BLOOD PRESSURE: 116 MMHG | BODY MASS INDEX: 27.83 KG/M2 | HEART RATE: 76 BPM | OXYGEN SATURATION: 99 % | DIASTOLIC BLOOD PRESSURE: 84 MMHG | WEIGHT: 177.3 LBS

## 2025-08-15 DIAGNOSIS — Z86.59 HISTORY OF OCD (OBSESSIVE COMPULSIVE DISORDER): ICD-10-CM

## 2025-08-15 DIAGNOSIS — F41.1 GENERALIZED ANXIETY DISORDER: ICD-10-CM

## 2025-08-15 DIAGNOSIS — N18.31 STAGE 3A CHRONIC KIDNEY DISEASE: ICD-10-CM

## 2025-08-15 DIAGNOSIS — E53.8 VITAMIN B12 DEFICIENCY: ICD-10-CM

## 2025-08-15 DIAGNOSIS — E11.22 TYPE 2 DIABETES MELLITUS WITH STAGE 3A CHRONIC KIDNEY DISEASE, WITHOUT LONG-TERM CURRENT USE OF INSULIN: ICD-10-CM

## 2025-08-15 DIAGNOSIS — F41.1 GENERALIZED ANXIETY DISORDER: Primary | ICD-10-CM

## 2025-08-15 DIAGNOSIS — E78.5 DYSLIPIDEMIA: ICD-10-CM

## 2025-08-15 DIAGNOSIS — N18.31 TYPE 2 DIABETES MELLITUS WITH STAGE 3A CHRONIC KIDNEY DISEASE, WITHOUT LONG-TERM CURRENT USE OF INSULIN: ICD-10-CM

## 2025-08-15 DIAGNOSIS — Z79.899 HIGH RISK MEDICATION USE: ICD-10-CM

## 2025-08-15 DIAGNOSIS — F33.0 MILD EPISODE OF RECURRENT MAJOR DEPRESSIVE DISORDER: ICD-10-CM

## 2025-08-15 DIAGNOSIS — Z00.00 GENERAL MEDICAL EXAM: Primary | ICD-10-CM

## 2025-08-15 DIAGNOSIS — Z11.59 NEED FOR HEPATITIS C SCREENING TEST: ICD-10-CM

## 2025-08-15 DIAGNOSIS — F51.01 PRIMARY INSOMNIA: ICD-10-CM

## 2025-08-15 PROBLEM — E03.9 ACQUIRED HYPOTHYROIDISM: Status: RESOLVED | Noted: 2023-01-06 | Resolved: 2025-08-15

## 2025-08-15 PROBLEM — F33.1 MAJOR DEPRESSIVE DISORDER, RECURRENT EPISODE, MODERATE: Status: RESOLVED | Noted: 2025-07-25 | Resolved: 2025-08-15

## 2025-08-15 PROBLEM — I10 PRIMARY HYPERTENSION: Status: RESOLVED | Noted: 2023-01-06 | Resolved: 2025-08-15

## 2025-08-15 PROBLEM — R05.1 ACUTE COUGH: Status: RESOLVED | Noted: 2022-11-28 | Resolved: 2025-08-15

## 2025-08-15 PROBLEM — G93.32 POST-COVID CHRONIC FATIGUE: Status: RESOLVED | Noted: 2022-11-07 | Resolved: 2025-08-15

## 2025-08-15 PROBLEM — E66.01 MORBID OBESITY: Status: RESOLVED | Noted: 2023-01-06 | Resolved: 2025-08-15

## 2025-08-15 PROBLEM — M15.0 PRIMARY OSTEOARTHRITIS INVOLVING MULTIPLE JOINTS: Status: RESOLVED | Noted: 2023-02-07 | Resolved: 2025-08-15

## 2025-08-15 PROBLEM — E79.0 ELEVATED URIC ACID IN BLOOD: Status: RESOLVED | Noted: 2023-02-07 | Resolved: 2025-08-15

## 2025-08-15 PROBLEM — N39.46 MIXED STRESS AND URGE URINARY INCONTINENCE: Status: RESOLVED | Noted: 2023-01-06 | Resolved: 2025-08-15

## 2025-08-15 PROBLEM — L29.9 PRURITUS: Status: RESOLVED | Noted: 2023-07-07 | Resolved: 2025-08-15

## 2025-08-15 PROBLEM — U09.9 POST-COVID CHRONIC FATIGUE: Status: RESOLVED | Noted: 2022-11-07 | Resolved: 2025-08-15

## 2025-08-15 RX ORDER — LANOLIN ALCOHOL/MO/W.PET/CERES
1000 CREAM (GRAM) TOPICAL DAILY
Start: 2025-08-15

## 2025-08-15 RX ORDER — ROSUVASTATIN CALCIUM 5 MG/1
5 TABLET, COATED ORAL DAILY
Qty: 90 TABLET | Refills: 1 | Status: SHIPPED | OUTPATIENT
Start: 2025-08-15

## 2025-08-15 RX ORDER — CYANOCOBALAMIN 1000 UG/ML
1000 INJECTION, SOLUTION INTRAMUSCULAR; SUBCUTANEOUS ONCE
Status: COMPLETED | OUTPATIENT
Start: 2025-08-15 | End: 2025-08-15

## 2025-08-15 RX ORDER — TRAZODONE HYDROCHLORIDE 50 MG/1
TABLET ORAL
Qty: 270 TABLET | Refills: 1 | Status: SHIPPED | OUTPATIENT
Start: 2025-08-15

## 2025-08-15 RX ORDER — PROPRANOLOL HYDROCHLORIDE 10 MG/1
10 TABLET ORAL 3 TIMES DAILY PRN
Qty: 180 TABLET | Refills: 1 | Status: SHIPPED | OUTPATIENT
Start: 2025-08-15

## 2025-08-15 RX ADMIN — CYANOCOBALAMIN 1000 MCG: 1000 INJECTION, SOLUTION INTRAMUSCULAR; SUBCUTANEOUS at 08:43
